# Patient Record
Sex: FEMALE | Race: WHITE | Employment: UNEMPLOYED | URBAN - METROPOLITAN AREA
[De-identification: names, ages, dates, MRNs, and addresses within clinical notes are randomized per-mention and may not be internally consistent; named-entity substitution may affect disease eponyms.]

---

## 2017-11-01 ENCOUNTER — HOSPITAL ENCOUNTER (OUTPATIENT)
Dept: MAMMOGRAPHY | Age: 57
Discharge: HOME | End: 2017-11-01
Attending: OBSTETRICS & GYNECOLOGY | Admitting: OBSTETRICS & GYNECOLOGY

## 2017-11-01 DIAGNOSIS — Z12.31 ENCOUNTER FOR SCREENING MAMMOGRAM FOR MALIGNANT NEOPLASM OF BREAST: ICD-10-CM

## 2018-04-19 ENCOUNTER — TELEPHONE (OUTPATIENT)
Dept: ADMINISTRATIVE | Age: 58
End: 2018-04-19

## 2018-06-19 ENCOUNTER — TELEPHONE (OUTPATIENT)
Dept: PAIN MANAGEMENT | Age: 58
End: 2018-06-19

## 2018-06-19 ENCOUNTER — OFFICE VISIT (OUTPATIENT)
Dept: FAMILY MEDICINE CLINIC | Age: 58
End: 2018-06-19

## 2018-06-19 VITALS
BODY MASS INDEX: 31.05 KG/M2 | DIASTOLIC BLOOD PRESSURE: 80 MMHG | SYSTOLIC BLOOD PRESSURE: 124 MMHG | HEART RATE: 78 BPM | OXYGEN SATURATION: 98 % | WEIGHT: 159 LBS

## 2018-06-19 DIAGNOSIS — J01.00 ACUTE NON-RECURRENT MAXILLARY SINUSITIS: Primary | ICD-10-CM

## 2018-06-19 DIAGNOSIS — M54.2 CERVICAL PAIN (NECK): ICD-10-CM

## 2018-06-19 DIAGNOSIS — I10 ESSENTIAL HYPERTENSION: ICD-10-CM

## 2018-06-19 DIAGNOSIS — E78.2 MIXED HYPERLIPIDEMIA: ICD-10-CM

## 2018-06-19 DIAGNOSIS — M54.41 CHRONIC BILATERAL LOW BACK PAIN WITH RIGHT-SIDED SCIATICA: ICD-10-CM

## 2018-06-19 DIAGNOSIS — G89.29 CHRONIC BILATERAL LOW BACK PAIN WITH RIGHT-SIDED SCIATICA: ICD-10-CM

## 2018-06-19 PROCEDURE — 3017F COLORECTAL CA SCREEN DOC REV: CPT | Performed by: FAMILY MEDICINE

## 2018-06-19 PROCEDURE — 4004F PT TOBACCO SCREEN RCVD TLK: CPT | Performed by: FAMILY MEDICINE

## 2018-06-19 PROCEDURE — G8427 DOCREV CUR MEDS BY ELIG CLIN: HCPCS | Performed by: FAMILY MEDICINE

## 2018-06-19 PROCEDURE — G8419 CALC BMI OUT NRM PARAM NOF/U: HCPCS | Performed by: FAMILY MEDICINE

## 2018-06-19 PROCEDURE — 99202 OFFICE O/P NEW SF 15 MIN: CPT | Performed by: FAMILY MEDICINE

## 2018-06-19 RX ORDER — CEPHALEXIN 500 MG/1
500 CAPSULE ORAL 3 TIMES DAILY
Qty: 30 CAPSULE | Refills: 0 | Status: SHIPPED | OUTPATIENT
Start: 2018-06-19 | End: 2018-06-29

## 2018-06-19 RX ORDER — AMLODIPINE BESYLATE 5 MG/1
5 TABLET ORAL
COMMUNITY
Start: 2018-06-13 | End: 2019-01-04 | Stop reason: SDUPTHER

## 2018-06-19 ASSESSMENT — PATIENT HEALTH QUESTIONNAIRE - PHQ9
SUM OF ALL RESPONSES TO PHQ9 QUESTIONS 1 & 2: 2
1. LITTLE INTEREST OR PLEASURE IN DOING THINGS: 1
2. FEELING DOWN, DEPRESSED OR HOPELESS: 1
SUM OF ALL RESPONSES TO PHQ QUESTIONS 1-9: 2

## 2018-06-20 ASSESSMENT — ENCOUNTER SYMPTOMS
VOMITING: 0
COUGH: 1
SINUS PRESSURE: 1
DIARRHEA: 0

## 2018-06-21 ENCOUNTER — TELEPHONE (OUTPATIENT)
Dept: PAIN MANAGEMENT | Age: 58
End: 2018-06-21

## 2018-06-29 ENCOUNTER — OFFICE VISIT (OUTPATIENT)
Dept: PAIN MANAGEMENT | Age: 58
End: 2018-06-29

## 2018-06-29 VITALS
SYSTOLIC BLOOD PRESSURE: 140 MMHG | HEART RATE: 83 BPM | DIASTOLIC BLOOD PRESSURE: 96 MMHG | BODY MASS INDEX: 31.22 KG/M2 | WEIGHT: 159 LBS | HEIGHT: 60 IN

## 2018-06-29 DIAGNOSIS — M54.16 LUMBAR RADICULOPATHY: ICD-10-CM

## 2018-06-29 DIAGNOSIS — F11.90 CHRONIC, CONTINUOUS USE OF OPIOIDS: ICD-10-CM

## 2018-06-29 DIAGNOSIS — M54.12 CERVICAL RADICULOPATHY: Primary | ICD-10-CM

## 2018-06-29 PROBLEM — F41.9 ANXIETY: Status: ACTIVE | Noted: 2018-06-29

## 2018-06-29 PROBLEM — G89.29 CHRONIC LOW BACK PAIN: Status: ACTIVE | Noted: 2018-06-29

## 2018-06-29 PROBLEM — M54.50 CHRONIC LOW BACK PAIN: Status: ACTIVE | Noted: 2018-06-29

## 2018-06-29 PROBLEM — M54.2 NECK PAIN: Status: ACTIVE | Noted: 2018-06-29

## 2018-06-29 PROBLEM — I10 HYPERTENSION: Status: ACTIVE | Noted: 2018-06-29

## 2018-06-29 PROBLEM — F32.A DEPRESSION: Status: ACTIVE | Noted: 2018-06-29

## 2018-06-29 PROCEDURE — 4004F PT TOBACCO SCREEN RCVD TLK: CPT | Performed by: ANESTHESIOLOGY

## 2018-06-29 PROCEDURE — G8427 DOCREV CUR MEDS BY ELIG CLIN: HCPCS | Performed by: ANESTHESIOLOGY

## 2018-06-29 PROCEDURE — G8417 CALC BMI ABV UP PARAM F/U: HCPCS | Performed by: ANESTHESIOLOGY

## 2018-06-29 PROCEDURE — 3017F COLORECTAL CA SCREEN DOC REV: CPT | Performed by: ANESTHESIOLOGY

## 2018-06-29 PROCEDURE — 99204 OFFICE O/P NEW MOD 45 MIN: CPT | Performed by: ANESTHESIOLOGY

## 2018-06-29 RX ORDER — TRAMADOL HYDROCHLORIDE 50 MG/1
TABLET ORAL
Qty: 100 TABLET | Refills: 0 | Status: SHIPPED | OUTPATIENT
Start: 2018-07-02 | End: 2019-09-16 | Stop reason: ALTCHOICE

## 2018-06-29 RX ORDER — BACLOFEN 10 MG/1
10 TABLET ORAL 2 TIMES DAILY
Qty: 60 TABLET | Refills: 1 | Status: SHIPPED | OUTPATIENT
Start: 2018-06-29 | End: 2018-08-28

## 2018-06-29 ASSESSMENT — PATIENT HEALTH QUESTIONNAIRE - PHQ9
SUM OF ALL RESPONSES TO PHQ QUESTIONS 1-9: 0
SUM OF ALL RESPONSES TO PHQ9 QUESTIONS 1 & 2: 0
2. FEELING DOWN, DEPRESSED OR HOPELESS: 0
1. LITTLE INTEREST OR PLEASURE IN DOING THINGS: 0

## 2018-07-04 LAB
6-ACETYLMORPHINE: NOT DETECTED
7-AMINOCLONAZEPAM: NOT DETECTED
ALPHA-OH-ALPRAZOLAM: NOT DETECTED
ALPRAZOLAM: NOT DETECTED
AMPHETAMINE: NOT DETECTED
BARBITURATES: NOT DETECTED
BENZOYLECGONINE: NOT DETECTED
BUPRENORPHINE: NOT DETECTED
CARISOPRODOL: NOT DETECTED
CLONAZEPAM: NOT DETECTED
CODEINE: NOT DETECTED
CREATININE URINE: 226 MG/DL (ref 20–400)
DIAZEPAM: NOT DETECTED
DRUGS EXPECTED: NORMAL
EER PAIN MGT DRUG PANEL, HIGH RES/EMIT U: NORMAL
ETHYL GLUCURONIDE: NOT DETECTED
FENTANYL: NOT DETECTED
HYDROCODONE: NOT DETECTED
HYDROMORPHONE: NOT DETECTED
LORAZEPAM: NOT DETECTED
MARIJUANA METABOLITE: NOT DETECTED
MDA: NOT DETECTED
MDEA: NOT DETECTED
MDMA URINE: NOT DETECTED
MEPERIDINE: NOT DETECTED
METHADONE: NOT DETECTED
METHAMPHETAMINE: NOT DETECTED
METHYLPHENIDATE: NOT DETECTED
MIDAZOLAM: NOT DETECTED
MORPHINE: NOT DETECTED
NORBUPRENORPHINE, FREE: NOT DETECTED
NORDIAZEPAM: NOT DETECTED
NORFENTANYL: NOT DETECTED
NORHYDROCODONE, URINE: NOT DETECTED
NOROXYCODONE: NOT DETECTED
NOROXYMORPHONE, URINE: NOT DETECTED
OXAZEPAM: NOT DETECTED
OXYCODONE: NOT DETECTED
OXYMORPHONE: NOT DETECTED
PAIN MANAGEMENT DRUG PANEL: NORMAL
PAIN MANAGEMENT DRUG PANEL: NORMAL
PCP: NOT DETECTED
PHENTERMINE: NOT DETECTED
PROPOXYPHENE: NOT DETECTED
TAPENTADOL, URINE: NOT DETECTED
TAPENTADOL-O-SULFATE, URINE: NOT DETECTED
TEMAZEPAM: NOT DETECTED
TRAMADOL: PRESENT
ZOLPIDEM: NOT DETECTED

## 2018-07-23 ENCOUNTER — TELEPHONE (OUTPATIENT)
Dept: FAMILY MEDICINE CLINIC | Age: 58
End: 2018-07-23

## 2018-07-23 DIAGNOSIS — I10 ESSENTIAL HYPERTENSION: Primary | ICD-10-CM

## 2018-07-23 RX ORDER — LOSARTAN POTASSIUM AND HYDROCHLOROTHIAZIDE 25; 100 MG/1; MG/1
1 TABLET ORAL DAILY
Qty: 30 TABLET | Refills: 0 | Status: SHIPPED | OUTPATIENT
Start: 2018-07-23 | End: 2018-11-28

## 2018-07-23 NOTE — TELEPHONE ENCOUNTER
Laina Patterson is requesting refill(s) Losartan-Hydrochlorothiazide (Hyzaar)  Last OV 6/19/18 (pertaining to medication)  LR (First refill for this office) (per medication requested)  Next office visit scheduled or attempted Yes   If no, reason:        [facility] instead of the mail order this month because she is already really low and needs them sooner but in the future to send them through mail order.

## 2018-07-24 RX ORDER — LOSARTAN POTASSIUM AND HYDROCHLOROTHIAZIDE 25; 100 MG/1; MG/1
1 TABLET ORAL DAILY
Qty: 90 TABLET | Refills: 1 | Status: SHIPPED | OUTPATIENT
Start: 2018-07-24 | End: 2019-01-04 | Stop reason: SDUPTHER

## 2018-08-01 ENCOUNTER — TELEPHONE (OUTPATIENT)
Dept: FAMILY MEDICINE CLINIC | Age: 58
End: 2018-08-01

## 2018-08-01 DIAGNOSIS — K21.9 GASTROESOPHAGEAL REFLUX DISEASE, ESOPHAGITIS PRESENCE NOT SPECIFIED: ICD-10-CM

## 2018-08-01 DIAGNOSIS — E78.5 HYPERLIPIDEMIA, UNSPECIFIED HYPERLIPIDEMIA TYPE: ICD-10-CM

## 2018-08-01 DIAGNOSIS — F32.89 OTHER DEPRESSION: ICD-10-CM

## 2018-08-01 DIAGNOSIS — F41.9 ANXIETY: Primary | ICD-10-CM

## 2018-08-01 NOTE — TELEPHONE ENCOUNTER
I called [facility] and spoke with Rae Medrano. I advised her we received a call from Nelia Gonsales at [address] earlier. I asked for clarification from the message earlier about- \"they do not do automatic refills for the prescription\". What prescriptions is she talking about? Rae Medrano states she is not sure what Nelia Gonsales is talking about and is unable to help assist. I advised her if [facility] calls back regarding patient and those \"refills\", please give more specific details. Encounter closed due to insufficient information.

## 2018-08-01 NOTE — TELEPHONE ENCOUNTER
Lisy Found from [facility] called to let us know that they do not do automatic refills for the prescriptions. She would like to speak to a nurse when available.

## 2018-08-03 RX ORDER — VENLAFAXINE HYDROCHLORIDE 75 MG/1
75 CAPSULE, EXTENDED RELEASE ORAL DAILY
Qty: 90 CAPSULE | Refills: 1 | Status: SHIPPED | OUTPATIENT
Start: 2018-08-03 | End: 2019-01-04 | Stop reason: SDUPTHER

## 2018-08-03 RX ORDER — VENLAFAXINE HYDROCHLORIDE 150 MG/1
150 CAPSULE, EXTENDED RELEASE ORAL DAILY
Qty: 90 CAPSULE | Refills: 1 | Status: SHIPPED | OUTPATIENT
Start: 2018-08-03 | End: 2019-01-04 | Stop reason: SDUPTHER

## 2018-08-03 RX ORDER — ATORVASTATIN CALCIUM 40 MG/1
40 TABLET, FILM COATED ORAL DAILY
Qty: 90 TABLET | Refills: 1 | Status: SHIPPED | OUTPATIENT
Start: 2018-08-03 | End: 2019-01-04 | Stop reason: SDUPTHER

## 2018-08-03 RX ORDER — PANTOPRAZOLE SODIUM 40 MG/1
40 TABLET, DELAYED RELEASE ORAL DAILY
Qty: 90 TABLET | Refills: 1 | Status: SHIPPED | OUTPATIENT
Start: 2018-08-03 | End: 2019-01-04 | Stop reason: SDUPTHER

## 2018-08-03 NOTE — TELEPHONE ENCOUNTER
3 Rx's sent (to [facility]), including the Protonix. Nurse - Please clarify she's been taking Protonix for a while, and ask if 20mg or 40mg, also what it's for (? GERD or ?). ALso, please clarify about the Effexor - takes 150mg daily AND one 75mg daily too??? (If so, then please load the 75mg dose too, and I will send it to [facility] too.

## 2018-08-03 NOTE — TELEPHONE ENCOUNTER
I spoke with the pt, she states that she does take the Protonix 40 mg and that she has been on it for \"years\" for indigestion. I asked her if she was definitely taking both the 75 mg and 150 mg of venlafaxine and she states that she is. She states that her old dr had her taking 225 mg.

## 2018-08-15 DIAGNOSIS — I10 ESSENTIAL HYPERTENSION: Primary | ICD-10-CM

## 2018-08-15 RX ORDER — LOSARTAN POTASSIUM AND HYDROCHLOROTHIAZIDE 25; 100 MG/1; MG/1
TABLET ORAL
Qty: 30 TABLET | Refills: 0 | OUTPATIENT
Start: 2018-08-15

## 2018-09-17 ENCOUNTER — NURSE ONLY (OUTPATIENT)
Dept: FAMILY MEDICINE CLINIC | Age: 58
End: 2018-09-17

## 2018-09-17 DIAGNOSIS — E78.2 MIXED HYPERLIPIDEMIA: ICD-10-CM

## 2018-09-17 DIAGNOSIS — F41.9 ANXIETY: ICD-10-CM

## 2018-09-17 DIAGNOSIS — F32.89 OTHER DEPRESSION: ICD-10-CM

## 2018-09-17 DIAGNOSIS — I10 ESSENTIAL HYPERTENSION: ICD-10-CM

## 2018-09-17 DIAGNOSIS — F41.9 ANXIETY: Primary | ICD-10-CM

## 2018-09-17 LAB
A/G RATIO: 1.9 (ref 1.1–2.2)
ALBUMIN SERPL-MCNC: 4.6 G/DL (ref 3.4–5)
ALP BLD-CCNC: 67 U/L (ref 40–129)
ALT SERPL-CCNC: 32 U/L (ref 10–40)
ANION GAP SERPL CALCULATED.3IONS-SCNC: 17 MMOL/L (ref 3–16)
AST SERPL-CCNC: 31 U/L (ref 15–37)
BILIRUB SERPL-MCNC: 0.4 MG/DL (ref 0–1)
BUN BLDV-MCNC: 15 MG/DL (ref 7–20)
CALCIUM SERPL-MCNC: 9.8 MG/DL (ref 8.3–10.6)
CHLORIDE BLD-SCNC: 102 MMOL/L (ref 99–110)
CHOLESTEROL, TOTAL: 177 MG/DL (ref 0–199)
CO2: 25 MMOL/L (ref 21–32)
CREAT SERPL-MCNC: 0.9 MG/DL (ref 0.6–1.1)
GFR AFRICAN AMERICAN: >60
GFR NON-AFRICAN AMERICAN: >60
GLOBULIN: 2.4 G/DL
GLUCOSE BLD-MCNC: 99 MG/DL (ref 70–99)
HDLC SERPL-MCNC: 32 MG/DL (ref 40–60)
LDL CHOLESTEROL CALCULATED: 92 MG/DL
POTASSIUM SERPL-SCNC: 3.6 MMOL/L (ref 3.5–5.1)
SODIUM BLD-SCNC: 144 MMOL/L (ref 136–145)
TOTAL PROTEIN: 7 G/DL (ref 6.4–8.2)
TRIGL SERPL-MCNC: 264 MG/DL (ref 0–150)
VLDLC SERPL CALC-MCNC: 53 MG/DL

## 2018-09-17 PROCEDURE — 36415 COLL VENOUS BLD VENIPUNCTURE: CPT | Performed by: FAMILY MEDICINE

## 2018-09-17 NOTE — TELEPHONE ENCOUNTER
Patient has 2 days left. She is wanting a rx sent to [facility] in [facility]. And then send RX to mail order.        Henrietta Izaguirre is requesting refill(s) busPIRone (BUSPAR) 10 MG tablet   Last OV 6/19/18 (pertaining to medication)  LR  (per medication requested)  Next office visit scheduled or attempted Yes   If no, reason:

## 2018-09-18 RX ORDER — BUSPIRONE HYDROCHLORIDE 10 MG/1
30 TABLET ORAL 3 TIMES DAILY
Qty: 810 TABLET | Refills: 1 | Status: SHIPPED | OUTPATIENT
Start: 2018-09-18 | End: 2018-11-28 | Stop reason: SDUPTHER

## 2018-09-18 RX ORDER — BUSPIRONE HYDROCHLORIDE 10 MG/1
30 TABLET ORAL 3 TIMES DAILY
Qty: 270 TABLET | Refills: 2 | Status: SHIPPED | OUTPATIENT
Start: 2018-09-18 | End: 2019-03-25

## 2018-09-18 NOTE — TELEPHONE ENCOUNTER
Done.    Nurse - please let patient know she is due for a follow-up office visit within the next 1-3 months.

## 2018-09-18 NOTE — TELEPHONE ENCOUNTER
Done - I sent 90-day to her mail-order pharmacy (and earlier today sent local 30-day Rx with refills) as requested. Has office visit with me scheduled (on 1/4/19).

## 2018-09-20 ENCOUNTER — TELEPHONE (OUTPATIENT)
Dept: FAMILY MEDICINE CLINIC | Age: 58
End: 2018-09-20

## 2018-09-20 NOTE — TELEPHONE ENCOUNTER
Fax from [facility] saying her benefit plan only pays for 6  buspar tabs/day and her directions say 3 tabs cande    Spoke with Trenton Stephens and she only takes 3 tabs bid and she just received her script in the mail / will call if problems amt says #[phone] shes not sure how many she got

## 2018-10-31 ENCOUNTER — PATIENT MESSAGE (OUTPATIENT)
Dept: FAMILY MEDICINE CLINIC | Age: 58
End: 2018-10-31

## 2018-10-31 RX ORDER — FENOFIBRATE 160 MG/1
160 TABLET ORAL DAILY
Qty: 90 TABLET | Refills: 3 | Status: SHIPPED | OUTPATIENT
Start: 2018-10-31 | End: 2018-11-28 | Stop reason: SDUPTHER

## 2018-10-31 NOTE — TELEPHONE ENCOUNTER
Ella Zhang is requesting refill(s) fenofibrate  Last OV 6/19/18 (pertaining to medication)  LR ? (per medication requested)  Next office visit scheduled or attempted Yes   If no, reason:  Pt is scheduled for 1/4/19

## 2018-11-05 ENCOUNTER — HOSPITAL ENCOUNTER (OUTPATIENT)
Dept: WOMENS IMAGING | Age: 58
Discharge: HOME | End: 2018-11-05
Payer: COMMERCIAL

## 2018-11-05 DIAGNOSIS — Z12.31 VISIT FOR SCREENING MAMMOGRAM: ICD-10-CM

## 2018-11-05 DIAGNOSIS — M54.12 CERVICAL RADICULOPATHY: ICD-10-CM

## 2018-11-05 PROCEDURE — 77067 SCR MAMMO BI INCL CAD: CPT

## 2018-11-28 ENCOUNTER — OFFICE VISIT (OUTPATIENT)
Dept: FAMILY MEDICINE CLINIC | Age: 58
End: 2018-11-28
Payer: COMMERCIAL

## 2018-11-28 VITALS
OXYGEN SATURATION: 95 % | HEART RATE: 75 BPM | BODY MASS INDEX: 31.02 KG/M2 | HEIGHT: 60 IN | WEIGHT: 158 LBS | DIASTOLIC BLOOD PRESSURE: 72 MMHG | RESPIRATION RATE: 18 BRPM | TEMPERATURE: 97.4 F | SYSTOLIC BLOOD PRESSURE: 120 MMHG

## 2018-11-28 DIAGNOSIS — R19.7 DIARRHEA OF PRESUMED INFECTIOUS ORIGIN: Primary | ICD-10-CM

## 2018-11-28 PROCEDURE — G8484 FLU IMMUNIZE NO ADMIN: HCPCS | Performed by: PHYSICIAN ASSISTANT

## 2018-11-28 PROCEDURE — G8427 DOCREV CUR MEDS BY ELIG CLIN: HCPCS | Performed by: PHYSICIAN ASSISTANT

## 2018-11-28 PROCEDURE — 4004F PT TOBACCO SCREEN RCVD TLK: CPT | Performed by: PHYSICIAN ASSISTANT

## 2018-11-28 PROCEDURE — 3017F COLORECTAL CA SCREEN DOC REV: CPT | Performed by: PHYSICIAN ASSISTANT

## 2018-11-28 PROCEDURE — 99213 OFFICE O/P EST LOW 20 MIN: CPT | Performed by: PHYSICIAN ASSISTANT

## 2018-11-28 PROCEDURE — G8417 CALC BMI ABV UP PARAM F/U: HCPCS | Performed by: PHYSICIAN ASSISTANT

## 2018-11-28 RX ORDER — FENOFIBRATE 160 MG/1
160 TABLET ORAL DAILY
Qty: 90 TABLET | Refills: 3 | Status: SHIPPED | OUTPATIENT
Start: 2018-11-28 | End: 2019-01-04 | Stop reason: SDUPTHER

## 2018-11-28 RX ORDER — METRONIDAZOLE 500 MG/1
500 TABLET ORAL 3 TIMES DAILY
Qty: 21 TABLET | Refills: 0 | Status: SHIPPED | OUTPATIENT
Start: 2018-11-28 | End: 2018-12-05

## 2018-11-28 RX ORDER — CIPROFLOXACIN 750 MG/1
750 TABLET, FILM COATED ORAL 2 TIMES DAILY
Qty: 14 TABLET | Refills: 0 | Status: SHIPPED | OUTPATIENT
Start: 2018-11-28 | End: 2018-12-05

## 2018-11-28 RX ORDER — FENOFIBRATE 160 MG/1
160 TABLET ORAL DAILY
Qty: 90 TABLET | Refills: 3 | Status: SHIPPED | OUTPATIENT
Start: 2018-11-28 | End: 2018-11-28 | Stop reason: CLARIF

## 2018-11-28 NOTE — PROGRESS NOTES
mouth daily 90 tablet 1    venlafaxine (EFFEXOR XR) 75 MG extended release capsule Take 1 capsule by mouth daily (with the 150mg capsule too) 90 capsule 1    losartan-hydrochlorothiazide (HYZAAR) 100-25 MG per tablet Take 1 tablet by mouth daily 90 tablet 1    amLODIPine (NORVASC) 5 MG tablet Take 5 mg by mouth      gabapentin (NEURONTIN) 300 MG capsule Take 300 mg by mouth 3 times daily.  traMADol (ULTRAM) 50 MG tablet Tapering dose: May take 1 tab by mouth q6h for 2 weeks (max 4 tab/day), and then reduce to 1 tab q8h PRN (max 3 tab/day). 100 tablet 0     No current facility-administered medications for this visit. PHYSICAL EXAM     Vitals:    11/28/18 1056   BP: 120/72   Site: Right Upper Arm   Position: Sitting   Pulse: 75   Resp: 18   Temp: 97.4 °F (36.3 °C)   TempSrc: Oral   SpO2: 95%   Weight: 158 lb (71.7 kg)   Height: 5' (1.524 m)     APPEARANCE: Pleasant, friendly, well-nourished. No acute distress. HEENT: Head: Normocephalic, atraumatic. Eyes: EOMI,PERRLA. Conjunctiva pink and moist.  Sclera white. Ears: External ears uniform. Hearing intact. Nose: No septal deviation. Mouth: Oral mucosa moist. Throat is without erythema, exudates, edema. NECK: No lymphadenopathy or masses. HEART: Reg rate and rhythm. No murmurs, rubs, or gallops. LUNGS: Clear to auscultation. No wheezes, rales, or rhonchi. ABDOMEN:  Soft, Hyperactive bowel sounds throughout. Minimal tenderness. No masses or organomegaly. MUSCULOSKELETAL:  No new deformity. No clubbing, cyanosis or edema. NEUROLOGIC: Normal gross and sensory exam.  SKIN: Warm, dry, normal turgor. Cap refill <3secs. No rashes, petechiae, purpura. ADDITIONAL STUDIES     Pertinent laboratory results and/or imaging reviewed. No orders of the defined types were placed in this encounter. IMPRESSION/ PLAN      1.  Diarrhea of presumed infectious origin       Due to the International travel and malodorous stools will add Flagyl to

## 2018-12-10 ENCOUNTER — OFFICE VISIT (OUTPATIENT)
Dept: FAMILY MEDICINE CLINIC | Age: 58
End: 2018-12-10
Payer: COMMERCIAL

## 2018-12-10 VITALS
HEART RATE: 79 BPM | SYSTOLIC BLOOD PRESSURE: 122 MMHG | DIASTOLIC BLOOD PRESSURE: 78 MMHG | WEIGHT: 157.8 LBS | BODY MASS INDEX: 30.98 KG/M2 | TEMPERATURE: 98.2 F | RESPIRATION RATE: 18 BRPM | OXYGEN SATURATION: 94 % | HEIGHT: 60 IN

## 2018-12-10 DIAGNOSIS — K58.0 IRRITABLE BOWEL SYNDROME WITH DIARRHEA: Primary | ICD-10-CM

## 2018-12-10 PROCEDURE — 4004F PT TOBACCO SCREEN RCVD TLK: CPT | Performed by: PHYSICIAN ASSISTANT

## 2018-12-10 PROCEDURE — G8427 DOCREV CUR MEDS BY ELIG CLIN: HCPCS | Performed by: PHYSICIAN ASSISTANT

## 2018-12-10 PROCEDURE — G8417 CALC BMI ABV UP PARAM F/U: HCPCS | Performed by: PHYSICIAN ASSISTANT

## 2018-12-10 PROCEDURE — 3017F COLORECTAL CA SCREEN DOC REV: CPT | Performed by: PHYSICIAN ASSISTANT

## 2018-12-10 PROCEDURE — 99213 OFFICE O/P EST LOW 20 MIN: CPT | Performed by: PHYSICIAN ASSISTANT

## 2018-12-10 PROCEDURE — G8484 FLU IMMUNIZE NO ADMIN: HCPCS | Performed by: PHYSICIAN ASSISTANT

## 2019-01-04 ENCOUNTER — OFFICE VISIT (OUTPATIENT)
Dept: FAMILY MEDICINE CLINIC | Age: 59
End: 2019-01-04
Payer: COMMERCIAL

## 2019-01-04 VITALS
OXYGEN SATURATION: 98 % | WEIGHT: 157.2 LBS | SYSTOLIC BLOOD PRESSURE: 128 MMHG | DIASTOLIC BLOOD PRESSURE: 60 MMHG | HEART RATE: 79 BPM | BODY MASS INDEX: 30.86 KG/M2 | HEIGHT: 60 IN

## 2019-01-04 DIAGNOSIS — F41.9 ANXIETY: ICD-10-CM

## 2019-01-04 DIAGNOSIS — F17.200 TOBACCO USE DISORDER: ICD-10-CM

## 2019-01-04 DIAGNOSIS — I10 ESSENTIAL HYPERTENSION: Primary | ICD-10-CM

## 2019-01-04 DIAGNOSIS — F32.89 OTHER DEPRESSION: ICD-10-CM

## 2019-01-04 DIAGNOSIS — G89.29 CHRONIC RIGHT-SIDED LOW BACK PAIN WITH SCIATICA, SCIATICA LATERALITY UNSPECIFIED: ICD-10-CM

## 2019-01-04 DIAGNOSIS — K58.0 IRRITABLE BOWEL SYNDROME WITH DIARRHEA: ICD-10-CM

## 2019-01-04 DIAGNOSIS — M54.40 CHRONIC RIGHT-SIDED LOW BACK PAIN WITH SCIATICA, SCIATICA LATERALITY UNSPECIFIED: ICD-10-CM

## 2019-01-04 DIAGNOSIS — E78.5 HYPERLIPIDEMIA, UNSPECIFIED HYPERLIPIDEMIA TYPE: ICD-10-CM

## 2019-01-04 DIAGNOSIS — K21.9 GASTROESOPHAGEAL REFLUX DISEASE, ESOPHAGITIS PRESENCE NOT SPECIFIED: ICD-10-CM

## 2019-01-04 PROCEDURE — 99214 OFFICE O/P EST MOD 30 MIN: CPT | Performed by: FAMILY MEDICINE

## 2019-01-04 RX ORDER — VENLAFAXINE HYDROCHLORIDE 75 MG/1
75 CAPSULE, EXTENDED RELEASE ORAL DAILY
Qty: 90 CAPSULE | Refills: 1 | Status: SHIPPED | OUTPATIENT
Start: 2019-01-04 | End: 2019-07-04 | Stop reason: SDUPTHER

## 2019-01-04 RX ORDER — ATORVASTATIN CALCIUM 40 MG/1
40 TABLET, FILM COATED ORAL DAILY
Qty: 90 TABLET | Refills: 1 | Status: SHIPPED | OUTPATIENT
Start: 2019-01-04 | End: 2019-07-04 | Stop reason: SDUPTHER

## 2019-01-04 RX ORDER — LOSARTAN POTASSIUM AND HYDROCHLOROTHIAZIDE 25; 100 MG/1; MG/1
1 TABLET ORAL DAILY
Qty: 90 TABLET | Refills: 1 | Status: SHIPPED | OUTPATIENT
Start: 2019-01-04 | End: 2019-07-04 | Stop reason: SDUPTHER

## 2019-01-04 RX ORDER — PANTOPRAZOLE SODIUM 40 MG/1
40 TABLET, DELAYED RELEASE ORAL DAILY
Qty: 90 TABLET | Refills: 1 | Status: SHIPPED | OUTPATIENT
Start: 2019-01-04 | End: 2019-07-04 | Stop reason: SDUPTHER

## 2019-01-04 RX ORDER — BUSPIRONE HYDROCHLORIDE 30 MG/1
30 TABLET ORAL 3 TIMES DAILY
Qty: 90 TABLET | Refills: 1 | Status: SHIPPED | OUTPATIENT
Start: 2019-01-04 | End: 2019-03-25 | Stop reason: SDUPTHER

## 2019-01-04 RX ORDER — FENOFIBRATE 160 MG/1
160 TABLET ORAL DAILY
Qty: 90 TABLET | Refills: 3 | Status: SHIPPED | OUTPATIENT
Start: 2019-01-04 | End: 2019-12-31

## 2019-01-04 RX ORDER — AMLODIPINE BESYLATE 5 MG/1
5 TABLET ORAL DAILY
Qty: 90 TABLET | Refills: 1 | Status: SHIPPED | OUTPATIENT
Start: 2019-01-04 | End: 2019-07-04 | Stop reason: SDUPTHER

## 2019-01-04 RX ORDER — VENLAFAXINE HYDROCHLORIDE 150 MG/1
150 CAPSULE, EXTENDED RELEASE ORAL DAILY
Qty: 90 CAPSULE | Refills: 1 | Status: SHIPPED | OUTPATIENT
Start: 2019-01-04 | End: 2019-11-21 | Stop reason: SDUPTHER

## 2019-01-06 PROBLEM — F17.200 TOBACCO USE DISORDER: Status: ACTIVE | Noted: 2019-01-06

## 2019-01-06 ASSESSMENT — ENCOUNTER SYMPTOMS
ABDOMINAL PAIN: 0
DIARRHEA: 0
SHORTNESS OF BREATH: 0
EYE PAIN: 0
COUGH: 0

## 2019-03-07 DIAGNOSIS — E78.5 HYPERLIPIDEMIA, UNSPECIFIED HYPERLIPIDEMIA TYPE: ICD-10-CM

## 2019-03-07 DIAGNOSIS — I10 ESSENTIAL HYPERTENSION: Primary | ICD-10-CM

## 2019-03-07 DIAGNOSIS — Z86.39 HISTORY OF ELEVATED GLUCOSE: ICD-10-CM

## 2019-03-25 DIAGNOSIS — F41.9 ANXIETY: ICD-10-CM

## 2019-03-25 RX ORDER — BUSPIRONE HYDROCHLORIDE 10 MG/1
30 TABLET ORAL 2 TIMES DAILY
Qty: 60 TABLET | Refills: 0 | Status: SHIPPED | OUTPATIENT
Start: 2019-03-25 | End: 2019-04-23 | Stop reason: SDUPTHER

## 2019-03-25 RX ORDER — BUSPIRONE HYDROCHLORIDE 30 MG/1
30 TABLET ORAL 2 TIMES DAILY
Qty: 180 TABLET | Refills: 1 | Status: SHIPPED | OUTPATIENT
Start: 2019-03-25 | End: 2019-05-29 | Stop reason: SDUPTHER

## 2019-03-27 ENCOUNTER — NURSE ONLY (OUTPATIENT)
Dept: FAMILY MEDICINE CLINIC | Age: 59
End: 2019-03-27
Payer: COMMERCIAL

## 2019-03-27 DIAGNOSIS — Z86.39 HISTORY OF ELEVATED GLUCOSE: ICD-10-CM

## 2019-03-27 DIAGNOSIS — I10 ESSENTIAL HYPERTENSION: ICD-10-CM

## 2019-03-27 DIAGNOSIS — E78.5 HYPERLIPIDEMIA, UNSPECIFIED HYPERLIPIDEMIA TYPE: ICD-10-CM

## 2019-03-27 LAB
ANION GAP SERPL CALCULATED.3IONS-SCNC: 11 MMOL/L (ref 3–16)
BUN BLDV-MCNC: 13 MG/DL (ref 7–20)
CALCIUM SERPL-MCNC: 10.5 MG/DL (ref 8.3–10.6)
CHLORIDE BLD-SCNC: 103 MMOL/L (ref 99–110)
CHOLESTEROL, TOTAL: 174 MG/DL (ref 0–199)
CO2: 29 MMOL/L (ref 21–32)
CREAT SERPL-MCNC: 0.8 MG/DL (ref 0.6–1.1)
GFR AFRICAN AMERICAN: >60
GFR NON-AFRICAN AMERICAN: >60
GLUCOSE BLD-MCNC: 95 MG/DL (ref 70–99)
HDLC SERPL-MCNC: 35 MG/DL (ref 40–60)
LDL CHOLESTEROL CALCULATED: 97 MG/DL
POTASSIUM SERPL-SCNC: 4 MMOL/L (ref 3.5–5.1)
SODIUM BLD-SCNC: 143 MMOL/L (ref 136–145)
TRIGL SERPL-MCNC: 210 MG/DL (ref 0–150)
VLDLC SERPL CALC-MCNC: 42 MG/DL

## 2019-03-27 PROCEDURE — 36415 COLL VENOUS BLD VENIPUNCTURE: CPT | Performed by: FAMILY MEDICINE

## 2019-03-28 ENCOUNTER — TELEPHONE (OUTPATIENT)
Dept: FAMILY MEDICINE CLINIC | Age: 59
End: 2019-03-28

## 2019-03-28 LAB
ESTIMATED AVERAGE GLUCOSE: 128.4 MG/DL
HBA1C MFR BLD: 6.1 %

## 2019-04-23 RX ORDER — BUSPIRONE HYDROCHLORIDE 10 MG/1
30 TABLET ORAL 2 TIMES DAILY
Qty: 60 TABLET | Refills: 0 | Status: SHIPPED | OUTPATIENT
Start: 2019-04-23 | End: 2019-06-25 | Stop reason: SDUPTHER

## 2019-04-23 NOTE — TELEPHONE ENCOUNTER
Jose Wan [phone] (home)    is requesting refill(s) of medication [facility]  [facility]     Last OV 1/4/19 (pertaining to medication)   Last refill 3/25/19 (per medication requested)  Next office visit scheduled or attempted Yes  Date 10/11/19  If No, reason

## 2019-05-29 DIAGNOSIS — F41.9 ANXIETY: ICD-10-CM

## 2019-05-29 NOTE — TELEPHONE ENCOUNTER
Patient called stating that she received a prescription for the incorrect dosage once again. This has happened to her plenty of times and she needs the busPIRone (BUSPAR) 30 MG tablet    for 3 times daily sent to her pharmacy not the two times daily.

## 2019-05-31 RX ORDER — BUSPIRONE HYDROCHLORIDE 30 MG/1
30 TABLET ORAL 3 TIMES DAILY
Qty: 270 TABLET | Refills: 1 | Status: SHIPPED | OUTPATIENT
Start: 2019-05-31 | End: 2019-06-25

## 2019-07-04 DIAGNOSIS — E78.5 HYPERLIPIDEMIA, UNSPECIFIED HYPERLIPIDEMIA TYPE: ICD-10-CM

## 2019-07-04 DIAGNOSIS — F32.89 OTHER DEPRESSION: ICD-10-CM

## 2019-07-04 DIAGNOSIS — F41.9 ANXIETY: ICD-10-CM

## 2019-07-04 DIAGNOSIS — I10 ESSENTIAL HYPERTENSION: ICD-10-CM

## 2019-07-04 DIAGNOSIS — K21.9 GASTROESOPHAGEAL REFLUX DISEASE, ESOPHAGITIS PRESENCE NOT SPECIFIED: ICD-10-CM

## 2019-07-05 NOTE — TELEPHONE ENCOUNTER
Nola Merino is requesting refill(s) Effexor  Last OV 1/4/19 (pertaining to medication)  LR 4/23/19 (per medication requested)  Next office visit scheduled or attempted Yes, 10/11/19   If no, reason:      Nola Merino is requesting refill(s) Hyzaar  Last OV 1/4/19 (pertaining to medication)  LR 4/23/19 (per medication requested)  Next office visit scheduled or attempted Yes, 10/11/19   If no, reason:      Nola Merino is requesting refill(s) Protonix  Last OV 1/4/19 (pertaining to medication)  LR 4/23/19 (per medication requested)  Next office visit scheduled or attempted Yes, 10/11/19   If no, reason:      Nola Merino is requesting refill(s) Norvasc  Last OV 1/4/19 (pertaining to medication)  LR 4/23/19 (per medication requested)  Next office visit scheduled or attempted Yes, 10/11/19   If no, reason:        Nola Merino is requesting refill(s) Lipitor   Last OV 1/4/19 (pertaining to medication)  LR 4/23/19 (per medication requested)  Next office visit scheduled or attempted Yes, 10/11/19   If no, reason:

## 2019-07-08 RX ORDER — AMLODIPINE BESYLATE 5 MG/1
TABLET ORAL
Qty: 90 TABLET | Refills: 1 | Status: SHIPPED | OUTPATIENT
Start: 2019-07-08 | End: 2020-01-07

## 2019-07-08 RX ORDER — ATORVASTATIN CALCIUM 40 MG/1
TABLET, FILM COATED ORAL
Qty: 90 TABLET | Refills: 1 | Status: SHIPPED | OUTPATIENT
Start: 2019-07-08 | End: 2020-01-07

## 2019-07-08 RX ORDER — LOSARTAN POTASSIUM AND HYDROCHLOROTHIAZIDE 25; 100 MG/1; MG/1
TABLET ORAL
Qty: 90 TABLET | Refills: 1 | Status: SHIPPED | OUTPATIENT
Start: 2019-07-08 | End: 2020-01-07

## 2019-07-08 RX ORDER — VENLAFAXINE HYDROCHLORIDE 75 MG/1
CAPSULE, EXTENDED RELEASE ORAL
Qty: 90 CAPSULE | Refills: 1 | Status: SHIPPED | OUTPATIENT
Start: 2019-07-08 | End: 2020-01-16

## 2019-07-08 RX ORDER — PANTOPRAZOLE SODIUM 40 MG/1
TABLET, DELAYED RELEASE ORAL
Qty: 90 TABLET | Refills: 1 | Status: SHIPPED | OUTPATIENT
Start: 2019-07-08 | End: 2020-01-07

## 2019-09-16 ENCOUNTER — OFFICE VISIT (OUTPATIENT)
Dept: FAMILY MEDICINE CLINIC | Age: 59
End: 2019-09-16
Payer: COMMERCIAL

## 2019-09-16 VITALS
DIASTOLIC BLOOD PRESSURE: 64 MMHG | WEIGHT: 156 LBS | BODY MASS INDEX: 30.63 KG/M2 | SYSTOLIC BLOOD PRESSURE: 130 MMHG | HEIGHT: 60 IN | OXYGEN SATURATION: 99 % | TEMPERATURE: 98 F | HEART RATE: 93 BPM

## 2019-09-16 DIAGNOSIS — R35.0 URINARY FREQUENCY: ICD-10-CM

## 2019-09-16 DIAGNOSIS — R30.0 DYSURIA: Primary | ICD-10-CM

## 2019-09-16 LAB
BILIRUBIN, POC: NORMAL
BLOOD URINE, POC: NORMAL
CLARITY, POC: CLEAR
COLOR, POC: NORMAL
GLUCOSE URINE, POC: 0
KETONES, POC: 0
LEUKOCYTE EST, POC: NORMAL
NITRITE, POC: POSITIVE
PH, POC: 6.5
PROTEIN, POC: NORMAL
SPECIFIC GRAVITY, POC: 1.03
UROBILINOGEN, POC: 4

## 2019-09-16 PROCEDURE — 81002 URINALYSIS NONAUTO W/O SCOPE: CPT | Performed by: FAMILY MEDICINE

## 2019-09-16 PROCEDURE — 99213 OFFICE O/P EST LOW 20 MIN: CPT | Performed by: FAMILY MEDICINE

## 2019-09-16 RX ORDER — SULFAMETHOXAZOLE AND TRIMETHOPRIM 800; 160 MG/1; MG/1
1 TABLET ORAL 2 TIMES DAILY
Qty: 10 TABLET | Refills: 0 | Status: SHIPPED | OUTPATIENT
Start: 2019-09-16 | End: 2019-09-21

## 2019-09-17 ASSESSMENT — ENCOUNTER SYMPTOMS
NAUSEA: 0
VOMITING: 0
BACK PAIN: 0

## 2019-09-18 LAB
ORGANISM: ABNORMAL
URINE CULTURE, ROUTINE: ABNORMAL

## 2019-10-17 ENCOUNTER — OFFICE VISIT (OUTPATIENT)
Dept: FAMILY MEDICINE CLINIC | Age: 59
End: 2019-10-17
Payer: COMMERCIAL

## 2019-10-17 DIAGNOSIS — F41.9 ANXIETY: ICD-10-CM

## 2019-10-17 DIAGNOSIS — R73.09 ELEVATED GLUCOSE: ICD-10-CM

## 2019-10-17 DIAGNOSIS — F17.200 TOBACCO USE DISORDER: ICD-10-CM

## 2019-10-17 DIAGNOSIS — K21.9 GASTROESOPHAGEAL REFLUX DISEASE, ESOPHAGITIS PRESENCE NOT SPECIFIED: ICD-10-CM

## 2019-10-17 DIAGNOSIS — Z23 NEED FOR INFLUENZA VACCINATION: ICD-10-CM

## 2019-10-17 DIAGNOSIS — R05.9 COUGH: ICD-10-CM

## 2019-10-17 DIAGNOSIS — I10 ESSENTIAL HYPERTENSION: Primary | ICD-10-CM

## 2019-10-17 DIAGNOSIS — E78.2 MIXED HYPERLIPIDEMIA: ICD-10-CM

## 2019-10-17 LAB
ANION GAP SERPL CALCULATED.3IONS-SCNC: 18 MMOL/L (ref 3–16)
BUN BLDV-MCNC: 12 MG/DL (ref 7–20)
CALCIUM SERPL-MCNC: 10 MG/DL (ref 8.3–10.6)
CHLORIDE BLD-SCNC: 103 MMOL/L (ref 99–110)
CHOLESTEROL, TOTAL: 169 MG/DL (ref 0–199)
CO2: 23 MMOL/L (ref 21–32)
CREAT SERPL-MCNC: 0.8 MG/DL (ref 0.6–1.1)
GFR AFRICAN AMERICAN: >60
GFR NON-AFRICAN AMERICAN: >60
GLUCOSE BLD-MCNC: 88 MG/DL (ref 70–99)
HDLC SERPL-MCNC: 35 MG/DL (ref 40–60)
LDL CHOLESTEROL CALCULATED: 97 MG/DL
POTASSIUM SERPL-SCNC: 4 MMOL/L (ref 3.5–5.1)
SODIUM BLD-SCNC: 144 MMOL/L (ref 136–145)
TRIGL SERPL-MCNC: 186 MG/DL (ref 0–150)
VLDLC SERPL CALC-MCNC: 37 MG/DL

## 2019-10-17 PROCEDURE — 90686 IIV4 VACC NO PRSV 0.5 ML IM: CPT | Performed by: FAMILY MEDICINE

## 2019-10-17 PROCEDURE — 99214 OFFICE O/P EST MOD 30 MIN: CPT | Performed by: FAMILY MEDICINE

## 2019-10-17 PROCEDURE — 90471 IMMUNIZATION ADMIN: CPT | Performed by: FAMILY MEDICINE

## 2019-10-17 PROCEDURE — 36415 COLL VENOUS BLD VENIPUNCTURE: CPT | Performed by: FAMILY MEDICINE

## 2019-10-18 VITALS
WEIGHT: 158.6 LBS | DIASTOLIC BLOOD PRESSURE: 74 MMHG | HEIGHT: 60 IN | TEMPERATURE: 97.8 F | HEART RATE: 76 BPM | BODY MASS INDEX: 31.14 KG/M2 | SYSTOLIC BLOOD PRESSURE: 166 MMHG | OXYGEN SATURATION: 99 %

## 2019-10-18 LAB
ESTIMATED AVERAGE GLUCOSE: 119.8 MG/DL
HBA1C MFR BLD: 5.8 %

## 2019-10-18 ASSESSMENT — ENCOUNTER SYMPTOMS
COUGH: 1
EYE PAIN: 0
SHORTNESS OF BREATH: 0
WHEEZING: 0
ABDOMINAL PAIN: 0
DIARRHEA: 0

## 2019-10-23 DIAGNOSIS — F41.9 ANXIETY: ICD-10-CM

## 2019-10-23 RX ORDER — BUSPIRONE HYDROCHLORIDE 30 MG/1
TABLET ORAL
Qty: 270 TABLET | Refills: 1 | OUTPATIENT
Start: 2019-10-23

## 2019-11-12 ENCOUNTER — TELEPHONE (OUTPATIENT)
Dept: FAMILY MEDICINE CLINIC | Age: 59
End: 2019-11-12

## 2019-11-12 DIAGNOSIS — F41.9 ANXIETY: Primary | ICD-10-CM

## 2019-11-12 RX ORDER — BUSPIRONE HYDROCHLORIDE 10 MG/1
10 TABLET ORAL 3 TIMES DAILY
Qty: 270 TABLET | Refills: 1 | Status: SHIPPED | OUTPATIENT
Start: 2019-11-12 | End: 2020-05-04 | Stop reason: SDUPTHER

## 2019-11-21 DIAGNOSIS — F41.9 ANXIETY: ICD-10-CM

## 2019-11-21 DIAGNOSIS — F32.89 OTHER DEPRESSION: ICD-10-CM

## 2019-11-25 RX ORDER — VENLAFAXINE HYDROCHLORIDE 150 MG/1
CAPSULE, EXTENDED RELEASE ORAL
Qty: 90 CAPSULE | Refills: 1 | Status: SHIPPED | OUTPATIENT
Start: 2019-11-25 | End: 2020-05-26

## 2019-12-28 ENCOUNTER — HOSPITAL ENCOUNTER (OUTPATIENT)
Dept: WOMENS IMAGING | Age: 59
Discharge: HOME | End: 2019-12-28
Payer: COMMERCIAL

## 2019-12-28 DIAGNOSIS — Z12.31 ENCOUNTER FOR SCREENING MAMMOGRAM FOR BREAST CANCER: ICD-10-CM

## 2019-12-28 PROCEDURE — 77067 SCR MAMMO BI INCL CAD: CPT

## 2019-12-31 DIAGNOSIS — E78.5 HYPERLIPIDEMIA, UNSPECIFIED HYPERLIPIDEMIA TYPE: ICD-10-CM

## 2019-12-31 RX ORDER — FENOFIBRATE 160 MG/1
TABLET ORAL
Qty: 90 TABLET | Refills: 1 | Status: SHIPPED | OUTPATIENT
Start: 2019-12-31 | End: 2020-05-29 | Stop reason: SDUPTHER

## 2020-01-06 NOTE — TELEPHONE ENCOUNTER
Melany Marrero is requesting refill(s) losartan/hctz  Last OV 10/17/19 (pertaining to medication)  LR 7/8/19 (per medication requested)  Next office visit scheduled or attempted Yes   If no, reason:  4/23/20    Melany Marrero is requesting refill(s) pantoprazole  Last OV 10/17/19 (pertaining to medication)  LR 7/8/19 (per medication requested)  Next office visit scheduled or attempted Yes   If no, reason:  4/23/20    Melany Marrero is requesting refill(s) lipitor  Last OV 10/17/19 (pertaining to medication)  LR 7/8/19 (per medication requested)  Next office visit scheduled or attempted Yes   If no, reason:  4/23/20     Melany Marrero is requesting refill(s) amlodipine   Last OV 10/17/19 (pertaining to medication)  LR 7/8/19 (per medication requested)  Next office visit scheduled or attempted Yes   If no, reason:  4/23/20

## 2020-01-07 RX ORDER — AMLODIPINE BESYLATE 5 MG/1
TABLET ORAL
Qty: 90 TABLET | Refills: 1 | Status: SHIPPED | OUTPATIENT
Start: 2020-01-07 | End: 2020-05-29 | Stop reason: SDUPTHER

## 2020-01-07 RX ORDER — ATORVASTATIN CALCIUM 40 MG/1
TABLET, FILM COATED ORAL
Qty: 90 TABLET | Refills: 1 | Status: SHIPPED | OUTPATIENT
Start: 2020-01-07 | End: 2020-05-29 | Stop reason: SDUPTHER

## 2020-01-07 RX ORDER — PANTOPRAZOLE SODIUM 40 MG/1
TABLET, DELAYED RELEASE ORAL
Qty: 90 TABLET | Refills: 1 | Status: SHIPPED | OUTPATIENT
Start: 2020-01-07 | End: 2020-05-29 | Stop reason: SDUPTHER

## 2020-01-07 RX ORDER — LOSARTAN POTASSIUM AND HYDROCHLOROTHIAZIDE 25; 100 MG/1; MG/1
TABLET ORAL
Qty: 90 TABLET | Refills: 1 | Status: SHIPPED | OUTPATIENT
Start: 2020-01-07 | End: 2020-05-29 | Stop reason: SDUPTHER

## 2020-01-17 RX ORDER — VENLAFAXINE HYDROCHLORIDE 75 MG/1
CAPSULE, EXTENDED RELEASE ORAL
Qty: 90 CAPSULE | Refills: 1 | Status: SHIPPED | OUTPATIENT
Start: 2020-01-17 | End: 2020-05-26 | Stop reason: SDUPTHER

## 2020-01-27 ENCOUNTER — OFFICE VISIT (OUTPATIENT)
Dept: FAMILY MEDICINE CLINIC | Age: 60
End: 2020-01-27
Payer: COMMERCIAL

## 2020-01-27 ENCOUNTER — TELEPHONE (OUTPATIENT)
Dept: FAMILY MEDICINE CLINIC | Age: 60
End: 2020-01-27

## 2020-01-27 VITALS
SYSTOLIC BLOOD PRESSURE: 134 MMHG | WEIGHT: 157.2 LBS | HEIGHT: 60 IN | BODY MASS INDEX: 30.86 KG/M2 | DIASTOLIC BLOOD PRESSURE: 72 MMHG | OXYGEN SATURATION: 95 % | HEART RATE: 95 BPM

## 2020-01-27 PROCEDURE — 99213 OFFICE O/P EST LOW 20 MIN: CPT | Performed by: PHYSICIAN ASSISTANT

## 2020-01-27 RX ORDER — TRAMADOL HYDROCHLORIDE 50 MG/1
TABLET ORAL
COMMUNITY
Start: 2019-12-18 | End: 2020-05-26

## 2020-01-27 RX ORDER — CEPHALEXIN 500 MG/1
500 CAPSULE ORAL 4 TIMES DAILY
Qty: 40 CAPSULE | Refills: 0 | Status: SHIPPED | OUTPATIENT
Start: 2020-01-27 | End: 2020-05-29 | Stop reason: ALTCHOICE

## 2020-01-30 ENCOUNTER — OFFICE VISIT (OUTPATIENT)
Dept: FAMILY MEDICINE CLINIC | Age: 60
End: 2020-01-30
Payer: COMMERCIAL

## 2020-01-30 VITALS
DIASTOLIC BLOOD PRESSURE: 72 MMHG | OXYGEN SATURATION: 98 % | SYSTOLIC BLOOD PRESSURE: 146 MMHG | TEMPERATURE: 97.9 F | BODY MASS INDEX: 30.98 KG/M2 | WEIGHT: 157.8 LBS | RESPIRATION RATE: 14 BRPM | HEIGHT: 60 IN | HEART RATE: 83 BPM

## 2020-01-30 PROCEDURE — 10061 I&D ABSCESS COMP/MULTIPLE: CPT | Performed by: PHYSICIAN ASSISTANT

## 2020-01-30 NOTE — PATIENT INSTRUCTIONS
WOUND CARE INSTRUCTIONS:      Keep dressing clean, dry, and intact. If dressing becomes wet or soiled, then change dressing more than once a day. Monitor for signs of infection such as fever, increased pain or redness, or pus,  and return to clinic immediately. If packing is applied, pull packing from wound in 36-48 hours. Use Ibuprofen or Tylenol for pain.

## 2020-01-30 NOTE — PROGRESS NOTES
ASSESSMENT/PLAN:    1. Abscess of left groin    2. Cellulitis of groin      - complete antibiotics. Monitor for signs of infection such as redness, streaking, pain, fever, or wound purulence and return to clinic immediately. Wound care instructions given.

## 2020-02-13 ENCOUNTER — OFFICE VISIT (OUTPATIENT)
Dept: FAMILY MEDICINE CLINIC | Age: 60
End: 2020-02-13
Payer: COMMERCIAL

## 2020-02-13 VITALS
HEART RATE: 80 BPM | SYSTOLIC BLOOD PRESSURE: 126 MMHG | WEIGHT: 157.2 LBS | RESPIRATION RATE: 18 BRPM | OXYGEN SATURATION: 96 % | DIASTOLIC BLOOD PRESSURE: 76 MMHG | HEIGHT: 60 IN | BODY MASS INDEX: 30.86 KG/M2 | TEMPERATURE: 98.1 F

## 2020-02-13 LAB
BILIRUBIN, POC: NORMAL
BLOOD URINE, POC: NORMAL
CLARITY, POC: NORMAL
COLOR, POC: NORMAL
GLUCOSE URINE, POC: NORMAL
KETONES, POC: NORMAL
LEUKOCYTE EST, POC: NORMAL
NITRITE, POC: POSITIVE
PH, POC: 5.5
PROTEIN, POC: 300
SPECIFIC GRAVITY, POC: 1.01
UROBILINOGEN, POC: 8

## 2020-02-13 PROCEDURE — 81002 URINALYSIS NONAUTO W/O SCOPE: CPT | Performed by: PHYSICIAN ASSISTANT

## 2020-02-13 PROCEDURE — 99213 OFFICE O/P EST LOW 20 MIN: CPT | Performed by: PHYSICIAN ASSISTANT

## 2020-02-13 RX ORDER — CIPROFLOXACIN 500 MG/1
500 TABLET, FILM COATED ORAL 2 TIMES DAILY
Qty: 10 TABLET | Refills: 0 | Status: SHIPPED | OUTPATIENT
Start: 2020-02-13 | End: 2020-02-18

## 2020-02-13 NOTE — PROGRESS NOTES
[address]ree [address] 28 COMPLAINT  Chief Complaint   Patient presents with    Cyst     Pt has a cyst on on right groin area. She wants to be looked at. Seen 1/30/20    Urinary Frequency     Pt has urinary frequency, pain and pressure x 1 day       HISTORY OF PRESENT  ILLNESS  Melany Marrero is a 63 y.o.  female   here for multiple medical issues  1. Dysuria, hematuria, or suprapubic pain consistent with her last urinary tract infection 6 months ago. Began Pyridium last night which helped. Urine today is bright orange. Denies fever, chills, flank pain. Denies back pain or radiating pain. Denies abnormal vaginal discharge, bleeding, burning. Denies GI symptoms, no melena or hematochezia, no diarrhea or constipation. 2.  Once abscess on left groin rechecked. 3.  Has atypical mole on right temple. Is concerned due to color changes and enlargement. Has seen dermatologist in the past for this although many years ago. ROS:  Remaining 14 ROS were reviewed and are unremarkable for other constitutional, EENT, cardiac, pulmonary, GI, , neurologic, musculoskeletal, or integumentary complaints. PAST MEDICAL/SURGICAL, SOCIAL, &  FAMILY HISTORY:  Reviewed and updated accordingly. ALLERGIES :   Erythromycin    MEDICATIONS:  Prior to Visit Medications    Medication Sig Taking?  Authorizing Provider   ciprofloxacin (CIPRO) 500 MG tablet Take 1 tablet by mouth 2 times daily for 5 days Yes MARY New   traMADol (ULTRAM) 50 MG tablet  Yes Historical Provider, MD   venlafaxine (EFFEXOR XR) 75 MG extended release capsule TAKE 1 CAPSULE DAILY (WITH  MG CAPSULE TOO) Yes Nino Watkins MD   amLODIPine (NORVASC) 5 MG tablet TAKE 1 TABLET DAILY Yes Krysta Haines MD   atorvastatin (LIPITOR) 40 MG tablet TAKE 1 TABLET DAILY Yes Nino Watkins MD   pantoprazole (PROTONIX) 40 MG tablet TAKE 1 TABLET DAILY Yes Krysta Haines MD   losartan-hydrochlorothiazide (HYZAAR) 100-25 MG per tablet TAKE 1 TABLET DAILY Yes Nino Watkins MD   fenofibrate 160 MG tablet TAKE 1 TABLET DAILY Yes Remigio Vasques MD   venlafaxine (EFFEXOR XR) 150 MG extended release capsule TAKE 1 CAPSULE DAILY Yes Nino Watkins MD   busPIRone (BUSPAR) 10 MG tablet Take 1 tablet by mouth 3 times daily Yes Nino Watkins MD   gabapentin (NEURONTIN) 300 MG capsule Take 300 mg by mouth 3 times daily. Yes Historical Provider, MD   cephALEXin (KEFLEX) 500 MG capsule Take 1 capsule by mouth 4 times daily  Patient not taking: Reported on 2/13/2020  MARY Headley   hyoscyamine (LEVSIN/SL) 125 MCG sublingual tablet Place 1 tablet under the tongue every 4 hours as needed for Cramping  Patient not taking: Reported on 1/27/2020  MARY Robins         PHYSICAL EXAM     Vitals:    02/13/20 1436   BP: 126/76   Site: Left Upper Arm   Position: Sitting   Pulse: 80   Resp: 18   Temp: 98.1 °F (36.7 °C)   TempSrc: Oral   SpO2: 96%   Weight: 157 lb 3.2 oz (71.3 kg)   Height: 5' (1.524 m)   Body mass index is 30.7 kg/m². APPEARANCE: Pleasant, friendly, well-nourished. No acute distress. Right temple shows a multicolored, irregular bordered, raised, atypical lesion. [place] .Eyes: EOMI,PERRLA. Conjunctiva pink and moist. Sclera white. Ears: Hearing intact. Nose:  Nares patent. Mouth: Oral mucosa moist. Throat is clear. NECK: No lymphadenopathy or masses. Moves neck fully. HEART: Reg rate and rhythm. No murmurs, rubs, or gallops. LUNGS: Clear to auscultation. No wheezes, rales, or rhonchi. ABDOMEN:  Soft, bowel sounds present, suprapubic tenderness. No CVAT. Left groin is a 4 x 2 cm hard nontender eschar in the left inguinal region. No surrounding inguinal lymphadenopathy. MUSCULOSKELETAL:  No clubbing, cyanosis or edema. NEUROLOGIC: Normal gross and sensory exam.  SKIN: Warm, dry, normal turgor. Cap refill <3secs. No rashes, petechiae, purpura.        ADDITIONAL STUDIES     Pertinent

## 2020-05-04 RX ORDER — BUSPIRONE HYDROCHLORIDE 10 MG/1
10 TABLET ORAL 3 TIMES DAILY
Qty: 270 TABLET | Refills: 1 | Status: SHIPPED | OUTPATIENT
Start: 2020-05-04 | End: 2020-09-04 | Stop reason: SDUPTHER

## 2020-05-26 RX ORDER — VENLAFAXINE HYDROCHLORIDE 75 MG/1
CAPSULE, EXTENDED RELEASE ORAL
Qty: 90 CAPSULE | Refills: 1 | Status: SHIPPED | OUTPATIENT
Start: 2020-05-26 | End: 2020-06-15

## 2020-05-29 ENCOUNTER — VIRTUAL VISIT (OUTPATIENT)
Dept: FAMILY MEDICINE CLINIC | Age: 60
End: 2020-05-29
Payer: COMMERCIAL

## 2020-05-29 VITALS — HEIGHT: 60 IN | WEIGHT: 152 LBS | BODY MASS INDEX: 29.84 KG/M2

## 2020-05-29 PROCEDURE — 99213 OFFICE O/P EST LOW 20 MIN: CPT | Performed by: PHYSICIAN ASSISTANT

## 2020-05-29 RX ORDER — ATORVASTATIN CALCIUM 40 MG/1
TABLET, FILM COATED ORAL
Qty: 90 TABLET | Refills: 1 | Status: SHIPPED | OUTPATIENT
Start: 2020-05-29 | End: 2020-10-09 | Stop reason: SDUPTHER

## 2020-05-29 RX ORDER — LOSARTAN POTASSIUM AND HYDROCHLOROTHIAZIDE 25; 100 MG/1; MG/1
TABLET ORAL
Qty: 90 TABLET | Refills: 1 | Status: SHIPPED | OUTPATIENT
Start: 2020-05-29 | End: 2020-10-09 | Stop reason: SDUPTHER

## 2020-05-29 RX ORDER — PANTOPRAZOLE SODIUM 40 MG/1
TABLET, DELAYED RELEASE ORAL
Qty: 90 TABLET | Refills: 1 | Status: SHIPPED | OUTPATIENT
Start: 2020-05-29 | End: 2020-12-14 | Stop reason: SDUPTHER

## 2020-05-29 RX ORDER — FENOFIBRATE 160 MG/1
TABLET ORAL
Qty: 90 TABLET | Refills: 1 | Status: SHIPPED | OUTPATIENT
Start: 2020-05-29 | End: 2020-12-02

## 2020-05-29 RX ORDER — AMLODIPINE BESYLATE 5 MG/1
TABLET ORAL
Qty: 90 TABLET | Refills: 1 | Status: SHIPPED | OUTPATIENT
Start: 2020-05-29 | End: 2020-10-09 | Stop reason: SDUPTHER

## 2020-05-29 RX ORDER — VENLAFAXINE HYDROCHLORIDE 150 MG/1
150 TABLET, EXTENDED RELEASE ORAL
COMMUNITY
End: 2020-06-10 | Stop reason: SDUPTHER

## 2020-05-29 ASSESSMENT — ENCOUNTER SYMPTOMS: RESPIRATORY NEGATIVE: 1

## 2020-06-10 ENCOUNTER — TELEPHONE (OUTPATIENT)
Dept: FAMILY MEDICINE CLINIC | Age: 60
End: 2020-06-10

## 2020-06-10 RX ORDER — VENLAFAXINE HYDROCHLORIDE 150 MG/1
150 TABLET, EXTENDED RELEASE ORAL
Qty: 90 TABLET | Refills: 0 | Status: SHIPPED | OUTPATIENT
Start: 2020-06-10 | End: 2020-06-15

## 2020-06-10 RX ORDER — VENLAFAXINE HYDROCHLORIDE 150 MG/1
150 CAPSULE, EXTENDED RELEASE ORAL DAILY
Qty: 10 CAPSULE | Refills: 0 | Status: SHIPPED | OUTPATIENT
Start: 2020-06-10 | End: 2020-06-15

## 2020-06-10 NOTE — TELEPHONE ENCOUNTER
Patient calling to check on status of venlafaxine 150 mg prescription refill. She is completely out and has been taking 3 of the 75 mg for the last 3 days. She is asking for a 10 day supply be called into [facility] and the rest of the prescription goes to [address]

## 2020-06-10 NOTE — TELEPHONE ENCOUNTER
Patient returned call. She takes one capsule of the 150 mg and one ER 75 mg Effexor. She is out of the 150 mg.  I advised the 75 mg has [facility]. She is also requesting a 10 day supply of the Effexor 150 mg be sent to [facility], [address] [place] [address] to hold her over until the mail delivery arrives. I also advised to not exceed dose prescribed. She understood.

## 2020-06-15 ENCOUNTER — TELEPHONE (OUTPATIENT)
Dept: FAMILY MEDICINE CLINIC | Age: 60
End: 2020-06-15

## 2020-06-15 RX ORDER — VENLAFAXINE HYDROCHLORIDE 150 MG/1
150 CAPSULE, EXTENDED RELEASE ORAL DAILY
Qty: 30 CAPSULE | Refills: 3 | Status: SHIPPED | OUTPATIENT
Start: 2020-06-15 | End: 2020-06-18 | Stop reason: SDUPTHER

## 2020-06-15 NOTE — TELEPHONE ENCOUNTER
Pt states that the [facility]. She states that she takes venlafaxine (EFFEXOR XR) 150 MG extended release capsule not the tablet. The tablet is much more expensive. She is wanting to know if the script can be changed from tablets to capsules.  Please advise

## 2020-06-18 RX ORDER — VENLAFAXINE HYDROCHLORIDE 150 MG/1
150 CAPSULE, EXTENDED RELEASE ORAL DAILY
Qty: 90 CAPSULE | Refills: 1 | Status: CANCELLED | OUTPATIENT
Start: 2020-06-18

## 2020-06-18 NOTE — TELEPHONE ENCOUNTER
Pt states that she finally got her venlafaxine script. She was under the impression that she was getting a 90 day supply and she only received a 30 day supply. Pt is wanting to know if the script can be changed to a 90 day supply.  Please advise

## 2020-06-19 RX ORDER — VENLAFAXINE HYDROCHLORIDE 150 MG/1
150 CAPSULE, EXTENDED RELEASE ORAL DAILY
Qty: 90 CAPSULE | Refills: 1 | Status: SHIPPED | OUTPATIENT
Start: 2020-06-19 | End: 2020-12-22 | Stop reason: SDUPTHER

## 2020-06-22 ENCOUNTER — TELEPHONE (OUTPATIENT)
Dept: FAMILY MEDICINE CLINIC | Age: 60
End: 2020-06-22

## 2020-06-22 ENCOUNTER — OFFICE VISIT (OUTPATIENT)
Dept: PRIMARY CARE CLINIC | Age: 60
End: 2020-06-22
Payer: COMMERCIAL

## 2020-06-22 VITALS — TEMPERATURE: 97.9 F | OXYGEN SATURATION: 97 % | HEART RATE: 92 BPM

## 2020-06-22 PROCEDURE — 99212 OFFICE O/P EST SF 10 MIN: CPT | Performed by: PHYSICIAN ASSISTANT

## 2020-06-22 NOTE — PROGRESS NOTES
6/22/2020    FLU/COVID-19 CLINIC EVALUATION    HPI  SYMPTOMS:    Symptom duration, days:  [] 1   [] 2   [] 3   [] 4   [] 5   [] 6   [] 7   [] 8   [] 9   [] 10   [] 11   [] 12   [x] 13 [] 14 +      Symptom course:   [] Worsening     [x] Stable     [] Improving    [x] Fevers  [] Symptom (not measured)  [x] Measured (Result: )99.6 tmax  [] Chills  [x] Cough  [] Coughing up blood  [x] Productive  [] Dry  [] Chest Congestion  [] Chest Tightness  [x] Nasal Congestion  [x] Runny Nose  [x] Feeling short of breath  [] Fatigue  [] Chest pain  [] Headaches  []Tolerable  [] Severe  [x] Sore throat  [] Muscle aches  [] Nausea  [] Decreased appetite  [] Vomiting  []Unable to keep fluids down  [] Diarrhea  []Severe    [] OTHER SYMPTOMS:  Sinus pain    RISK FACTORS: HIGH BLOOD PRESSURE  [] Pregnant or possibly pregnant  [] Age over 61  [] Diabetes  [] Heart disease  [] Asthma  [] COPD/Other chronic lung diseases  [] Active Cancer  [] On Chemotherapy  [] Taking oral steroids  [] History Lymphoma/Leukemia  [] Close contact with a lab confirmed COVID-19 patient within 14 days of symptom onset  [] History of travel from affected geographical areas within 14 days of symptom onset  [] Health Care Worker Exposure no symptoms  [] Health Care Worker Exposure symptomatic      VITALS:  Vitals:    06/22/20 1432   Pulse: 92   Temp: 97.9 °F (36.6 °C)   TempSrc: Oral   SpO2: 97%        PHYSICAL EXAMINATION:  [x]Alert   [x]Oriented to person/place/time    [x]No apparent distress     []Toxic appearing    [x]Breathing appears normal     []Appears tachypneic         [x]Speaking in full sentences     TESTS:  POCT FLU:  [] Positive     []Negative  POCT STREP:  [] Positive     []Negative    [x] COVID-19 Test sent: [] Blue   [] Red   [] Chest X-ray     ASSESSMENT:  [] Flu  [] Strep Throat  [] Uncertain Viral Respiratory Illness  [x] Possible COVID-19  [] Exposure COVID-19  [] Other:     PLAN:  [x] Discharge home with written instructions for:  [] Flu management  [] Strep throat management  [x] Possible COVID-19 exposure with self-quarantine   [] Possible COVID-19 with isolation instructions and management of symptoms  [x] Follow-up with primary care physician or emergency department if worsens  [] Referred to emergency department for evaluation    [] Evaluation per physician/nurse practitioner in clinic  [] Prescription sent in  [] No Prescription sent in    An  electronic signature was used to authenticate this note.      --MARY Tello on 6/22/2020 at 5:28 PM

## 2020-06-22 NOTE — PATIENT INSTRUCTIONS
bathroom, if available. Animals: You should restrict contact with pets and other animals while you are sick with COVID-19, just like you would around other people. Although there have not been reports of pets or other animals becoming sick with COVID-19, it is still recommended that people sick with COVID-19 limit contact with animals until more information is known about the virus. When possible, have another member of your household care for your animals while you are sick. If you are sick with COVID-19, avoid contact with your pet, including petting, snuggling, being kissed or licked, and sharing food. If you must care for your pet or be around animals while you are sick, wash your hands before and after you interact with pets and wear a facemask. Call ahead before visiting your doctor  If you have a medical appointment, call the healthcare provider and tell them that you have or may have COVID-19. This will help the healthcare providers office take steps to keep other people from getting infected or exposed. Wear a facemask  You should wear a facemask when you are around other people (e.g., sharing a room or vehicle) or pets and before you enter a healthcare providers office. If you are not able to wear a facemask (for example, because it causes trouble breathing), then people who live with you should not stay in the same room with you, or they should wear a facemask if they enter your room. Cover your coughs and sneezes  Cover your mouth and nose with a tissue when you cough or sneeze. Throw used tissues in a lined trash can. Immediately wash your hands with soap and water for at least 20 seconds or, if soap and water are not available, clean your hands with an alcohol-based hand  that contains at least 60% alcohol.   Clean your hands often  Wash your hands often with soap and water for at least 20 seconds, especially after blowing your nose, coughing, or sneezing; going to the bathroom; and You can now view your medical record. Additional Information  If you have questions, please contact your physician practice where you receive care. Remember, [employer] is NOT to be used for urgent needs. For medical emergencies, dial 911.

## 2020-06-25 ENCOUNTER — TELEPHONE (OUTPATIENT)
Dept: SPORTS MEDICINE | Age: 60
End: 2020-06-25

## 2020-06-25 ENCOUNTER — CARE COORDINATION (OUTPATIENT)
Dept: CASE MANAGEMENT | Age: 60
End: 2020-06-25

## 2020-06-25 LAB
SARS-COV-2: NOT DETECTED
SOURCE: NORMAL

## 2020-06-26 ENCOUNTER — CARE COORDINATION (OUTPATIENT)
Dept: CARE COORDINATION | Age: 60
End: 2020-06-26

## 2020-06-27 ENCOUNTER — CARE COORDINATION (OUTPATIENT)
Dept: CARE COORDINATION | Age: 60
End: 2020-06-27

## 2020-06-27 NOTE — CARE COORDINATION
Yellow alert noted in [facility] remote symptom monitoring program. Messaged patient to notify Elissa Haines if symptoms have worsened since yesterday. RN response   Thanks for letting us know about your symptoms. Please let me know if your symptoms have worsened since yesterday. Continue to rest and increase fluids, remember to follow a healthy diet which will help increase energy. You can call Za at [phone] until 1pm today if you need to speak with a nurse.

## 2020-09-08 RX ORDER — BUSPIRONE HYDROCHLORIDE 10 MG/1
10 TABLET ORAL 3 TIMES DAILY
Qty: 270 TABLET | Refills: 0 | Status: SHIPPED | OUTPATIENT
Start: 2020-09-08 | End: 2020-12-11

## 2020-09-09 RX ORDER — BUSPIRONE HYDROCHLORIDE 10 MG/1
10 TABLET ORAL 3 TIMES DAILY
Qty: 270 TABLET | Refills: 0 | Status: CANCELLED | OUTPATIENT
Start: 2020-09-09

## 2020-10-09 ENCOUNTER — OFFICE VISIT (OUTPATIENT)
Dept: FAMILY MEDICINE CLINIC | Age: 60
End: 2020-10-09
Payer: COMMERCIAL

## 2020-10-09 VITALS
DIASTOLIC BLOOD PRESSURE: 68 MMHG | TEMPERATURE: 97.6 F | BODY MASS INDEX: 30.35 KG/M2 | WEIGHT: 154.6 LBS | SYSTOLIC BLOOD PRESSURE: 128 MMHG | OXYGEN SATURATION: 98 % | HEIGHT: 60 IN | HEART RATE: 89 BPM

## 2020-10-09 LAB
A/G RATIO: 1.6 (ref 1.1–2.2)
ALBUMIN SERPL-MCNC: 4.4 G/DL (ref 3.4–5)
ALP BLD-CCNC: 71 U/L (ref 40–129)
ALT SERPL-CCNC: 19 U/L (ref 10–40)
ANION GAP SERPL CALCULATED.3IONS-SCNC: 14 MMOL/L (ref 3–16)
AST SERPL-CCNC: 21 U/L (ref 15–37)
BILIRUB SERPL-MCNC: 0.4 MG/DL (ref 0–1)
BUN BLDV-MCNC: 14 MG/DL (ref 7–20)
CALCIUM SERPL-MCNC: 10 MG/DL (ref 8.3–10.6)
CHLORIDE BLD-SCNC: 100 MMOL/L (ref 99–110)
CHOLESTEROL, TOTAL: 157 MG/DL (ref 0–199)
CO2: 25 MMOL/L (ref 21–32)
CREAT SERPL-MCNC: 0.9 MG/DL (ref 0.6–1.1)
GFR AFRICAN AMERICAN: >60
GFR NON-AFRICAN AMERICAN: >60
GLOBULIN: 2.7 G/DL
GLUCOSE BLD-MCNC: 92 MG/DL (ref 70–99)
HDLC SERPL-MCNC: 34 MG/DL (ref 40–60)
HEPATITIS C ANTIBODY INTERPRETATION: NORMAL
LDL CHOLESTEROL CALCULATED: 90 MG/DL
POTASSIUM SERPL-SCNC: 4 MMOL/L (ref 3.5–5.1)
SODIUM BLD-SCNC: 139 MMOL/L (ref 136–145)
TOTAL PROTEIN: 7.1 G/DL (ref 6.4–8.2)
TRIGL SERPL-MCNC: 166 MG/DL (ref 0–150)
VLDLC SERPL CALC-MCNC: 33 MG/DL

## 2020-10-09 PROCEDURE — 90686 IIV4 VACC NO PRSV 0.5 ML IM: CPT | Performed by: PHYSICIAN ASSISTANT

## 2020-10-09 PROCEDURE — 90471 IMMUNIZATION ADMIN: CPT | Performed by: PHYSICIAN ASSISTANT

## 2020-10-09 PROCEDURE — 99213 OFFICE O/P EST LOW 20 MIN: CPT | Performed by: PHYSICIAN ASSISTANT

## 2020-10-09 PROCEDURE — 36415 COLL VENOUS BLD VENIPUNCTURE: CPT | Performed by: PHYSICIAN ASSISTANT

## 2020-10-09 RX ORDER — VENLAFAXINE HYDROCHLORIDE 75 MG/1
CAPSULE, EXTENDED RELEASE ORAL
COMMUNITY
Start: 2020-09-07 | End: 2020-12-07 | Stop reason: DRUGHIGH

## 2020-10-09 RX ORDER — LOSARTAN POTASSIUM AND HYDROCHLOROTHIAZIDE 25; 100 MG/1; MG/1
TABLET ORAL
Qty: 90 TABLET | Refills: 1 | Status: SHIPPED | OUTPATIENT
Start: 2020-10-09 | End: 2021-05-18

## 2020-10-09 RX ORDER — AMLODIPINE BESYLATE 5 MG/1
TABLET ORAL
Qty: 90 TABLET | Refills: 1 | Status: SHIPPED | OUTPATIENT
Start: 2020-10-09 | End: 2021-05-18

## 2020-10-09 RX ORDER — TRAMADOL HYDROCHLORIDE 50 MG/1
TABLET ORAL
COMMUNITY
Start: 2020-08-31 | End: 2021-11-23

## 2020-10-09 RX ORDER — ATORVASTATIN CALCIUM 40 MG/1
TABLET, FILM COATED ORAL
Qty: 90 TABLET | Refills: 1 | Status: SHIPPED | OUTPATIENT
Start: 2020-10-09 | End: 2021-05-18

## 2020-10-09 NOTE — PROGRESS NOTES
Vaccine Information Sheet, \"Influenza - Inactivated\"  given to Yash Pires, or parent/legal guardian of  Yash Pires and verbalized understanding. Patient responses:    Have you ever had a reaction to a flu vaccine? No  Do you have any current illness? No  Have you ever had [place] Syndrome? No  Do you have a serious allergy to any of the follow: Neomycin, Polymyxin, Thimerosal, eggs or egg products? No    Flu vaccine given per order. Please see immunization tab. Risks and benefits explained. Current VIS given.
 Alcohol use: Yes     Comment: rarely once in great while       OBJECTIVE:  S1 and S2 normal, no murmurs, clicks, gallops or rubs. Regular rate and rhythm. Chest is clear; no wheezes or rales. No edema or JVD. ASSESSMENT:   Diagnosis Orders   1. Essential hypertension  losartan-hydroCHLOROthiazide (HYZAAR) 100-25 MG per tablet    amLODIPine (NORVASC) 5 MG tablet    Comprehensive Metabolic Panel   2. Need for influenza vaccination  INFLUENZA, QUADV, 3 YRS AND OLDER, IM PF, PREFILL SYR OR SDV, 0.5ML (AFLURIA QUADV, PF)   3. Hyperlipidemia, unspecified hyperlipidemia type  atorvastatin (LIPITOR) 40 MG tablet    Comprehensive Metabolic Panel    Lipid Panel   4. Elevated glucose  Hemoglobin A1C   5. Need for hepatitis C screening test  Hepatitis C Antibody       Plan:  1)  Medication: continue current medication regimen unchanged  2)  Recheck in 6 months, sooner should new symptoms or problems arise. Pap with Dr. Em Multani. Discussed smoking cessation.

## 2020-10-10 LAB
ESTIMATED AVERAGE GLUCOSE: 128.4 MG/DL
HBA1C MFR BLD: 6.1 %

## 2020-12-02 NOTE — TELEPHONE ENCOUNTER
Refill Request FENOFIBRATE TABS 160MG     Last Seen: 10/9/2020    Last Written: 5/29/20 90 tablets with 1 refill    Next Appointment:   No future appointments.           Requested Prescriptions     Pending Prescriptions Disp Refills    fenofibrate (TRIGLIDE) 160 MG tablet [Pharmacy Med Name: FENOFIBRATE TABS 160MG] 90 tablet 3     Sig: TAKE 1 TABLET DAILY

## 2020-12-03 RX ORDER — FENOFIBRATE 160 MG/1
TABLET ORAL
Qty: 90 TABLET | Refills: 1 | Status: SHIPPED | OUTPATIENT
Start: 2020-12-03 | End: 2021-11-03

## 2020-12-08 ENCOUNTER — TELEPHONE (OUTPATIENT)
Dept: FAMILY MEDICINE CLINIC | Age: 60
End: 2020-12-08

## 2020-12-08 RX ORDER — VENLAFAXINE HYDROCHLORIDE 75 MG/1
75 CAPSULE, EXTENDED RELEASE ORAL DAILY
Qty: 90 CAPSULE | Refills: 1 | Status: SHIPPED | OUTPATIENT
Start: 2020-12-08 | End: 2021-06-04

## 2020-12-08 NOTE — TELEPHONE ENCOUNTER
[facility]ine and to contact doctor with any questions. She is almost out and is wondering what the issue is. She is on two different milligrams, 150mg and 75mg, so she thinks that might be the confusion.

## 2020-12-10 NOTE — TELEPHONE ENCOUNTER
Refill Request BUSPIRONE HCL TABS 10MG     Last Seen: 10/9/2020    Last Written: 9/8/20 270 tablets with 0 refills    Next Appointment:   No future appointments.       Requested Prescriptions     Pending Prescriptions Disp Refills    busPIRone (BUSPAR) 10 MG tablet [Pharmacy Med Name: BUSPIRONE HCL TABS 10MG] 270 tablet 3     Sig: TAKE 1 TABLET THREE TIMES A DAY

## 2020-12-11 RX ORDER — BUSPIRONE HYDROCHLORIDE 10 MG/1
TABLET ORAL
Qty: 270 TABLET | Refills: 3 | Status: SHIPPED | OUTPATIENT
Start: 2020-12-11 | End: 2021-04-01

## 2020-12-14 RX ORDER — PANTOPRAZOLE SODIUM 40 MG/1
TABLET, DELAYED RELEASE ORAL
Qty: 90 TABLET | Refills: 1 | Status: SHIPPED | OUTPATIENT
Start: 2020-12-14 | End: 2021-06-04

## 2020-12-14 NOTE — TELEPHONE ENCOUNTER
Luis E Gold [phone] (home)    is requesting refill(s) of medication [facility] [address] 10/9/20 (pertaining to medication)   Last refill 5/29/20 (per medication requested)  Next office visit scheduled or attempted No  Date   If No, reason

## 2020-12-17 RX ORDER — BUSPIRONE HYDROCHLORIDE 10 MG/1
TABLET ORAL
Qty: 90 TABLET | Refills: 5 | OUTPATIENT
Start: 2020-12-17

## 2020-12-22 RX ORDER — VENLAFAXINE HYDROCHLORIDE 150 MG/1
150 CAPSULE, EXTENDED RELEASE ORAL DAILY
Qty: 90 CAPSULE | Refills: 1 | Status: SHIPPED | OUTPATIENT
Start: 2020-12-22 | End: 2021-06-04

## 2020-12-22 NOTE — TELEPHONE ENCOUNTER
Ilene Schaffer [phone] (home)    is requesting refill(s) of medication venlafaxine (EFFEXOR XR) 150 MG extended release capsule  to [facility]    Last OV 10/9/20 (pertaining to medication)   Last refill 6/19/20 (per medication requested)  Next office visit scheduled or attempted No  Date n/a  If No, reason n/a

## 2021-04-01 DIAGNOSIS — F41.9 ANXIETY: ICD-10-CM

## 2021-04-01 RX ORDER — BUSPIRONE HYDROCHLORIDE 10 MG/1
TABLET ORAL
Qty: 90 TABLET | Refills: 0 | Status: SHIPPED | OUTPATIENT
Start: 2021-04-01 | End: 2021-05-11 | Stop reason: SDUPTHER

## 2021-05-11 DIAGNOSIS — F41.9 ANXIETY: ICD-10-CM

## 2021-05-11 RX ORDER — BUSPIRONE HYDROCHLORIDE 10 MG/1
TABLET ORAL
Qty: 270 TABLET | Refills: 0 | Status: SHIPPED | OUTPATIENT
Start: 2021-05-11 | End: 2021-11-15

## 2021-05-11 NOTE — TELEPHONE ENCOUNTER
Request 90 day supply    Dilcia Todd [phone] (home)    is requesting refill(s) of medication [facility]    Last OV 10/9/20 (pertaining to medication)   Last refill 4/1/21 (per medication requested)  Next office visit scheduled or attempted Yes  Date 5/18/21  If No, reason

## 2021-05-18 ENCOUNTER — OFFICE VISIT (OUTPATIENT)
Dept: FAMILY MEDICINE CLINIC | Age: 61
End: 2021-05-18
Payer: COMMERCIAL

## 2021-05-18 VITALS
OXYGEN SATURATION: 96 % | TEMPERATURE: 98.5 F | HEIGHT: 60 IN | SYSTOLIC BLOOD PRESSURE: 118 MMHG | HEART RATE: 79 BPM | WEIGHT: 153.38 LBS | BODY MASS INDEX: 30.11 KG/M2 | DIASTOLIC BLOOD PRESSURE: 78 MMHG

## 2021-05-18 DIAGNOSIS — E78.5 HYPERLIPIDEMIA, UNSPECIFIED HYPERLIPIDEMIA TYPE: ICD-10-CM

## 2021-05-18 DIAGNOSIS — I10 ESSENTIAL HYPERTENSION: Primary | ICD-10-CM

## 2021-05-18 DIAGNOSIS — F41.9 ANXIETY: ICD-10-CM

## 2021-05-18 DIAGNOSIS — I10 ESSENTIAL HYPERTENSION: ICD-10-CM

## 2021-05-18 PROCEDURE — 90471 IMMUNIZATION ADMIN: CPT | Performed by: PHYSICIAN ASSISTANT

## 2021-05-18 PROCEDURE — 90732 PPSV23 VACC 2 YRS+ SUBQ/IM: CPT | Performed by: PHYSICIAN ASSISTANT

## 2021-05-18 PROCEDURE — 99214 OFFICE O/P EST MOD 30 MIN: CPT | Performed by: PHYSICIAN ASSISTANT

## 2021-05-18 RX ORDER — LOSARTAN POTASSIUM AND HYDROCHLOROTHIAZIDE 25; 100 MG/1; MG/1
TABLET ORAL
Qty: 90 TABLET | Refills: 1 | Status: SHIPPED | OUTPATIENT
Start: 2021-05-18 | End: 2021-11-15

## 2021-05-18 RX ORDER — ATORVASTATIN CALCIUM 40 MG/1
TABLET, FILM COATED ORAL
Qty: 90 TABLET | Refills: 1 | Status: SHIPPED | OUTPATIENT
Start: 2021-05-18 | End: 2021-11-15

## 2021-05-18 RX ORDER — AMLODIPINE BESYLATE 5 MG/1
TABLET ORAL
Qty: 90 TABLET | Refills: 1 | Status: SHIPPED | OUTPATIENT
Start: 2021-05-18 | End: 2021-11-15

## 2021-05-18 SDOH — ECONOMIC STABILITY: FOOD INSECURITY: WITHIN THE PAST 12 MONTHS, YOU WORRIED THAT YOUR FOOD WOULD RUN OUT BEFORE YOU GOT MONEY TO BUY MORE.: NEVER TRUE

## 2021-05-18 NOTE — TELEPHONE ENCOUNTER
Brain Humphrey [phone] (home)    is requesting refill(s) of medication [facility]or[facility]atin [facility] [address] 10/9/20 (pertaining to medication)   Last refill 10/9/20 (per medication requested)  Next office visit scheduled or attempted Yes  Date 5/18/21  If No, reason     Amlodipine-10/9/20  Losartan-Hydrochlorothiazide-10/9/20

## 2021-06-04 DIAGNOSIS — K21.9 GASTROESOPHAGEAL REFLUX DISEASE: ICD-10-CM

## 2021-06-04 RX ORDER — PANTOPRAZOLE SODIUM 40 MG/1
TABLET, DELAYED RELEASE ORAL
Qty: 90 TABLET | Refills: 1 | Status: SHIPPED | OUTPATIENT
Start: 2021-06-04 | End: 2021-11-23 | Stop reason: SDUPTHER

## 2021-06-04 RX ORDER — VENLAFAXINE HYDROCHLORIDE 75 MG/1
CAPSULE, EXTENDED RELEASE ORAL
Qty: 90 CAPSULE | Refills: 1 | Status: SHIPPED | OUTPATIENT
Start: 2021-06-04 | End: 2021-11-23 | Stop reason: SDUPTHER

## 2021-06-07 RX ORDER — VENLAFAXINE HYDROCHLORIDE 150 MG/1
CAPSULE, EXTENDED RELEASE ORAL
Qty: 90 CAPSULE | Refills: 1 | Status: SHIPPED | OUTPATIENT
Start: 2021-06-07 | End: 2021-11-23 | Stop reason: SDUPTHER

## 2021-06-08 RX ORDER — VENLAFAXINE HYDROCHLORIDE 75 MG/1
CAPSULE, EXTENDED RELEASE ORAL
Qty: 90 CAPSULE | Refills: 1 | OUTPATIENT
Start: 2021-06-08

## 2021-07-22 ENCOUNTER — TELEPHONE (OUTPATIENT)
Dept: FAMILY MEDICINE CLINIC | Age: 61
End: 2021-07-22

## 2021-07-22 NOTE — TELEPHONE ENCOUNTER
----- Message from Jeisonmas Jayden sent at 7/22/2021  8:11 AM EDT -----  Subject: Message to Provider    QUESTIONS  Information for Provider? Patient has been having a possible bad sinus   infection; head feels full and headache \"in eyes\". Had some symptoms   before, went away for 3-4 days, then came back late yesterday afternoon. Is leaving out of town today around 10:30 am and wanted to know if any   urgent appointments are available or if something can be called   in/recommended to take for trip. Previous symptoms felt side of face was   hurting.  ---------------------------------------------------------------------------  --------------  CALL BACK INFO  What is the best way for the office to contact you? OK to leave message on   voicemail  Preferred Call Back Phone Number? [phone]  ---------------------------------------------------------------------------  --------------  SCRIPT ANSWERS  Relationship to Patient? Self  Appointment reason? Symptomatic  Select script based on patient symptoms? Adult Cough/Cold Symptoms [Runny   Nose, Sore Throat, Flu, Sinus, Sinus Infection, Upper Respiratory   Infection [URI], Congestion]  Are you currently unable to finish sentences due to any difficulty   breathing? No  Are you unable to swallow liquids? No  Are you having fevers (100.4 or greater), chills, or sweats? No  Do you have COPD, asthma or a chronic lung condition? No  Have your symptoms been present for more than 5 days?  No

## 2021-07-22 NOTE — TELEPHONE ENCOUNTER
Patient has been using a netipot and taking benadryl. She says it is just her head, has headaches, a lot of sinus pain/pressure in head and face. She has high blood pressure and would like to know what Kait can recommend for her to take or if she can send something in for her, she is already on the road but if Kait is able to send something in she will let us know what pharmacy we can send to. She is ok to wait until Kait returns tomorrow.

## 2021-07-23 NOTE — TELEPHONE ENCOUNTER
Patient called to f/u on previous message. I advised of physician note, she states she has tried all of the above. She agreed to a virtual, but she is out of state, unable to schedule. I suggested Urgent Care or [facility]. She will go to one at her next stop. She will check with the insurance company as well for coverage.

## 2021-07-23 NOTE — TELEPHONE ENCOUNTER
I would recommend taking coricidin HBP and plain mucinex for symptom relief.  If not improving and feels she needs antibiotic she could do a virtual visit

## 2021-10-20 ENCOUNTER — HOSPITAL ENCOUNTER (OUTPATIENT)
Dept: WOMENS IMAGING | Age: 61
Discharge: HOME | End: 2021-10-20
Payer: COMMERCIAL

## 2021-10-20 VITALS — BODY MASS INDEX: 29.45 KG/M2 | WEIGHT: 150 LBS | HEIGHT: 60 IN

## 2021-10-20 DIAGNOSIS — Z12.31 ENCOUNTER FOR SCREENING MAMMOGRAM FOR BREAST CANCER: ICD-10-CM

## 2021-10-20 PROCEDURE — 77067 SCR MAMMO BI INCL CAD: CPT

## 2021-11-15 DIAGNOSIS — F41.9 ANXIETY: ICD-10-CM

## 2021-11-15 DIAGNOSIS — E78.5 HYPERLIPIDEMIA, UNSPECIFIED HYPERLIPIDEMIA TYPE: ICD-10-CM

## 2021-11-15 DIAGNOSIS — I10 ESSENTIAL HYPERTENSION: ICD-10-CM

## 2021-11-15 RX ORDER — AMLODIPINE BESYLATE 5 MG/1
TABLET ORAL
Qty: 90 TABLET | Refills: 1 | Status: SHIPPED | OUTPATIENT
Start: 2021-11-15 | End: 2022-02-07 | Stop reason: SDUPTHER

## 2021-11-15 RX ORDER — ATORVASTATIN CALCIUM 40 MG/1
TABLET, FILM COATED ORAL
Qty: 90 TABLET | Refills: 3 | Status: SHIPPED | OUTPATIENT
Start: 2021-11-15 | End: 2022-02-07 | Stop reason: SDUPTHER

## 2021-11-15 RX ORDER — LOSARTAN POTASSIUM AND HYDROCHLOROTHIAZIDE 25; 100 MG/1; MG/1
TABLET ORAL
Qty: 90 TABLET | Refills: 1 | Status: SHIPPED | OUTPATIENT
Start: 2021-11-15 | End: 2022-02-07 | Stop reason: SDUPTHER

## 2021-11-15 RX ORDER — BUSPIRONE HYDROCHLORIDE 10 MG/1
TABLET ORAL
Qty: 270 TABLET | Refills: 1 | Status: SHIPPED | OUTPATIENT
Start: 2021-11-15 | End: 2022-02-07 | Stop reason: SDUPTHER

## 2021-11-15 NOTE — TELEPHONE ENCOUNTER
Alvin Young [phone] (home)    is requesting refill(s) of medication Losartan-Hydrochlorothiazide to preferred pharmacy [facility]    Last OV 5/18/21 (pertaining to medication)   Last refill 5/18/21 (per medication requested)  Next office visit scheduled or attempted Yes  Date 11/19/21  If No, reason     Amlodipine- 5/18/21  Atorvastatin- 5/18/21  Buspirone- 5/11/21

## 2021-11-23 ENCOUNTER — OFFICE VISIT (OUTPATIENT)
Dept: FAMILY MEDICINE CLINIC | Age: 61
End: 2021-11-23
Payer: COMMERCIAL

## 2021-11-23 VITALS
HEART RATE: 78 BPM | SYSTOLIC BLOOD PRESSURE: 130 MMHG | BODY MASS INDEX: 30.43 KG/M2 | DIASTOLIC BLOOD PRESSURE: 66 MMHG | OXYGEN SATURATION: 95 % | WEIGHT: 155 LBS | HEIGHT: 60 IN

## 2021-11-23 DIAGNOSIS — E78.5 HYPERLIPIDEMIA, UNSPECIFIED HYPERLIPIDEMIA TYPE: ICD-10-CM

## 2021-11-23 DIAGNOSIS — K21.9 GASTROESOPHAGEAL REFLUX DISEASE, UNSPECIFIED WHETHER ESOPHAGITIS PRESENT: ICD-10-CM

## 2021-11-23 DIAGNOSIS — I10 ESSENTIAL HYPERTENSION: ICD-10-CM

## 2021-11-23 DIAGNOSIS — Z23 NEED FOR INFLUENZA VACCINATION: ICD-10-CM

## 2021-11-23 DIAGNOSIS — R73.01 ELEVATED FASTING GLUCOSE: Primary | ICD-10-CM

## 2021-11-23 LAB
A/G RATIO: 1.8 (ref 1.1–2.2)
ALBUMIN SERPL-MCNC: 4.6 G/DL (ref 3.4–5)
ALP BLD-CCNC: 66 U/L (ref 40–129)
ALT SERPL-CCNC: 16 U/L (ref 10–40)
ANION GAP SERPL CALCULATED.3IONS-SCNC: 13 MMOL/L (ref 3–16)
AST SERPL-CCNC: 22 U/L (ref 15–37)
BILIRUB SERPL-MCNC: <0.2 MG/DL (ref 0–1)
BUN BLDV-MCNC: 14 MG/DL (ref 7–20)
CALCIUM SERPL-MCNC: 10.1 MG/DL (ref 8.3–10.6)
CHLORIDE BLD-SCNC: 104 MMOL/L (ref 99–110)
CHOLESTEROL, TOTAL: 171 MG/DL (ref 0–199)
CO2: 25 MMOL/L (ref 21–32)
CREAT SERPL-MCNC: 0.7 MG/DL (ref 0.6–1.2)
GFR AFRICAN AMERICAN: >60
GFR NON-AFRICAN AMERICAN: >60
GLUCOSE BLD-MCNC: 98 MG/DL (ref 70–99)
HDLC SERPL-MCNC: 34 MG/DL (ref 40–60)
LDL CHOLESTEROL CALCULATED: 99 MG/DL
POTASSIUM SERPL-SCNC: 4 MMOL/L (ref 3.5–5.1)
SODIUM BLD-SCNC: 142 MMOL/L (ref 136–145)
TOTAL PROTEIN: 7.2 G/DL (ref 6.4–8.2)
TRIGL SERPL-MCNC: 192 MG/DL (ref 0–150)
VLDLC SERPL CALC-MCNC: 38 MG/DL

## 2021-11-23 PROCEDURE — 90686 IIV4 VACC NO PRSV 0.5 ML IM: CPT | Performed by: PHYSICIAN ASSISTANT

## 2021-11-23 PROCEDURE — 99214 OFFICE O/P EST MOD 30 MIN: CPT | Performed by: PHYSICIAN ASSISTANT

## 2021-11-23 PROCEDURE — 36415 COLL VENOUS BLD VENIPUNCTURE: CPT | Performed by: PHYSICIAN ASSISTANT

## 2021-11-23 PROCEDURE — 90471 IMMUNIZATION ADMIN: CPT | Performed by: PHYSICIAN ASSISTANT

## 2021-11-23 RX ORDER — VENLAFAXINE HYDROCHLORIDE 75 MG/1
CAPSULE, EXTENDED RELEASE ORAL
Qty: 90 CAPSULE | Refills: 1 | Status: SHIPPED | OUTPATIENT
Start: 2021-11-23 | End: 2022-02-07 | Stop reason: SDUPTHER

## 2021-11-23 RX ORDER — PANTOPRAZOLE SODIUM 40 MG/1
TABLET, DELAYED RELEASE ORAL
Qty: 90 TABLET | Refills: 1 | Status: SHIPPED | OUTPATIENT
Start: 2021-11-23 | End: 2022-02-07 | Stop reason: SDUPTHER

## 2021-11-23 RX ORDER — VENLAFAXINE HYDROCHLORIDE 150 MG/1
CAPSULE, EXTENDED RELEASE ORAL
Qty: 90 CAPSULE | Refills: 1 | Status: SHIPPED | OUTPATIENT
Start: 2021-11-23 | End: 2022-02-07 | Stop reason: SDUPTHER

## 2021-11-23 NOTE — PROGRESS NOTES
Vaccine Information Sheet, \"Influenza - Inactivated\"  given to Tez Bourgeois, or parent/legal guardian of  Tez Bourgeois and verbalized understanding. Patient responses:    Have you ever had a reaction to a flu vaccine? No  Do you have any current illness? No  Have you ever had Guillian Pembroke Syndrome? No  Do you have a serious allergy to any of the follow: Neomycin, Polymyxin, Thimerosal, eggs or egg products? No    Flu vaccine given per order. Please see immunization tab. Risks and benefits explained. Current VIS given.
Substance Use Topics    Alcohol use: Yes     Comment: rarely once in great while       OBJECTIVE:  S1 and S2 normal, no murmurs, clicks, gallops or rubs. Regular rate and rhythm. Chest is clear; no wheezes or rales. No edema or JVD. ASSESSMENT:     Diagnosis Orders   1. Elevated fasting glucose  Hemoglobin A1C   2. Hyperlipidemia, unspecified hyperlipidemia type  Lipid Panel    Comprehensive Metabolic Panel   3. Need for influenza vaccination  INFLUENZA, QUADV, 3 YRS AND OLDER, IM PF, PREFILL SYR OR SDV, 0.5ML (AFLURIA QUADV, PF)   4. Essential hypertension  Comprehensive Metabolic Panel   5. Gastroesophageal reflux disease  pantoprazole (PROTONIX) 40 MG tablet         Plan:  1)  Medication: continue current medication regimen unchanged  2)  Recheck in 6 months, sooner should new symptoms or problems arise.

## 2021-11-24 LAB
ESTIMATED AVERAGE GLUCOSE: 122.6 MG/DL
HBA1C MFR BLD: 5.9 %

## 2022-02-07 DIAGNOSIS — I10 ESSENTIAL HYPERTENSION: ICD-10-CM

## 2022-02-07 DIAGNOSIS — K21.9 GASTROESOPHAGEAL REFLUX DISEASE, UNSPECIFIED WHETHER ESOPHAGITIS PRESENT: ICD-10-CM

## 2022-02-07 DIAGNOSIS — E78.5 HYPERLIPIDEMIA, UNSPECIFIED HYPERLIPIDEMIA TYPE: ICD-10-CM

## 2022-02-07 DIAGNOSIS — F41.9 ANXIETY: ICD-10-CM

## 2022-02-07 RX ORDER — FENOFIBRATE 160 MG/1
TABLET ORAL
Qty: 90 TABLET | Refills: 1 | Status: SHIPPED | OUTPATIENT
Start: 2022-02-07 | End: 2022-09-12

## 2022-02-07 RX ORDER — VENLAFAXINE HYDROCHLORIDE 150 MG/1
CAPSULE, EXTENDED RELEASE ORAL
Qty: 90 CAPSULE | Refills: 1 | Status: SHIPPED | OUTPATIENT
Start: 2022-02-07 | End: 2022-07-08 | Stop reason: SDUPTHER

## 2022-02-07 RX ORDER — VENLAFAXINE HYDROCHLORIDE 75 MG/1
CAPSULE, EXTENDED RELEASE ORAL
Qty: 90 CAPSULE | Refills: 1 | Status: SHIPPED | OUTPATIENT
Start: 2022-02-07 | End: 2022-07-08 | Stop reason: SDUPTHER

## 2022-02-07 RX ORDER — BUSPIRONE HYDROCHLORIDE 10 MG/1
TABLET ORAL
Qty: 270 TABLET | Refills: 1 | Status: SHIPPED | OUTPATIENT
Start: 2022-02-07

## 2022-02-07 RX ORDER — PANTOPRAZOLE SODIUM 40 MG/1
TABLET, DELAYED RELEASE ORAL
Qty: 90 TABLET | Refills: 1 | Status: SHIPPED | OUTPATIENT
Start: 2022-02-07 | End: 2022-08-29

## 2022-02-07 RX ORDER — AMLODIPINE BESYLATE 5 MG/1
TABLET ORAL
Qty: 90 TABLET | Refills: 1 | Status: SHIPPED | OUTPATIENT
Start: 2022-02-07 | End: 2022-08-29

## 2022-02-07 RX ORDER — ATORVASTATIN CALCIUM 40 MG/1
TABLET, FILM COATED ORAL
Qty: 90 TABLET | Refills: 1 | Status: SHIPPED | OUTPATIENT
Start: 2022-02-07

## 2022-02-07 RX ORDER — LOSARTAN POTASSIUM AND HYDROCHLOROTHIAZIDE 25; 100 MG/1; MG/1
TABLET ORAL
Qty: 90 TABLET | Refills: 1 | Status: SHIPPED | OUTPATIENT
Start: 2022-02-07 | End: 2022-08-29

## 2022-02-07 NOTE — TELEPHONE ENCOUNTER
Last office visit 11/23/2021     Last written 11/23/21, 11/23/21, 11/15/21, 11/03/21, 11/15/21, 11/15/21, 11/15/21    Next office visit scheduled Visit date not found    Requested Prescriptions     Pending Prescriptions Disp Refills    venlafaxine (EFFEXOR XR) 75 MG extended release capsule 90 capsule 1     Sig: TAKE 1 CAPSULE DAILY    venlafaxine (EFFEXOR XR) 150 MG extended release capsule 90 capsule 1     Sig: TAKE 1 CAPSULE DAILY    pantoprazole (PROTONIX) 40 MG tablet 90 tablet 1     Sig: TAKE 1 TABLET DAILY    losartan-hydroCHLOROthiazide (HYZAAR) 100-25 MG per tablet 90 tablet 1    fenofibrate (TRIGLIDE) 160 MG tablet 90 tablet 1    busPIRone (BUSPAR) 10 MG tablet 270 tablet 1     Sig: TAKE 1 TABLET THREE TIMES A DAY    atorvastatin (LIPITOR) 40 MG tablet 90 tablet 1    amLODIPine (NORVASC) 5 MG tablet 90 tablet 1           *pt has new mail order [facility]

## 2022-03-21 ENCOUNTER — OFFICE VISIT (OUTPATIENT)
Dept: FAMILY MEDICINE CLINIC | Age: 62
End: 2022-03-21
Payer: COMMERCIAL

## 2022-03-21 VITALS
BODY MASS INDEX: 30.08 KG/M2 | DIASTOLIC BLOOD PRESSURE: 80 MMHG | HEIGHT: 60 IN | OXYGEN SATURATION: 98 % | SYSTOLIC BLOOD PRESSURE: 160 MMHG | WEIGHT: 153.2 LBS | HEART RATE: 94 BPM

## 2022-03-21 DIAGNOSIS — F32.89 OTHER DEPRESSION: Primary | ICD-10-CM

## 2022-03-21 DIAGNOSIS — M54.40 CHRONIC RIGHT-SIDED LOW BACK PAIN WITH SCIATICA, SCIATICA LATERALITY UNSPECIFIED: ICD-10-CM

## 2022-03-21 DIAGNOSIS — G89.29 CHRONIC RIGHT-SIDED LOW BACK PAIN WITH SCIATICA, SCIATICA LATERALITY UNSPECIFIED: ICD-10-CM

## 2022-03-21 DIAGNOSIS — G25.81 RESTLESS LEG: ICD-10-CM

## 2022-03-21 PROCEDURE — 3017F COLORECTAL CA SCREEN DOC REV: CPT | Performed by: PHYSICIAN ASSISTANT

## 2022-03-21 PROCEDURE — G8417 CALC BMI ABV UP PARAM F/U: HCPCS | Performed by: PHYSICIAN ASSISTANT

## 2022-03-21 PROCEDURE — G8427 DOCREV CUR MEDS BY ELIG CLIN: HCPCS | Performed by: PHYSICIAN ASSISTANT

## 2022-03-21 PROCEDURE — 4004F PT TOBACCO SCREEN RCVD TLK: CPT | Performed by: PHYSICIAN ASSISTANT

## 2022-03-21 PROCEDURE — G8482 FLU IMMUNIZE ORDER/ADMIN: HCPCS | Performed by: PHYSICIAN ASSISTANT

## 2022-03-21 PROCEDURE — 99214 OFFICE O/P EST MOD 30 MIN: CPT | Performed by: PHYSICIAN ASSISTANT

## 2022-03-21 RX ORDER — ROPINIROLE 0.25 MG/1
0.25 TABLET, FILM COATED ORAL NIGHTLY
Qty: 30 TABLET | Refills: 0 | Status: SHIPPED | OUTPATIENT
Start: 2022-03-21 | End: 2022-04-12

## 2022-03-21 RX ORDER — IBUPROFEN 800 MG/1
800 TABLET ORAL 3 TIMES DAILY PRN
Qty: 90 TABLET | Refills: 0 | Status: SHIPPED | OUTPATIENT
Start: 2022-03-21 | End: 2022-04-18

## 2022-03-21 RX ORDER — TRAMADOL HYDROCHLORIDE 50 MG/1
TABLET ORAL
COMMUNITY
Start: 2022-01-06 | End: 2022-03-21

## 2022-03-21 ASSESSMENT — PATIENT HEALTH QUESTIONNAIRE - PHQ9
9. THOUGHTS THAT YOU WOULD BE BETTER OFF DEAD, OR OF HURTING YOURSELF: 1
SUM OF ALL RESPONSES TO PHQ QUESTIONS 1-9: 22
6. FEELING BAD ABOUT YOURSELF - OR THAT YOU ARE A FAILURE OR HAVE LET YOURSELF OR YOUR FAMILY DOWN: 3
5. POOR APPETITE OR OVEREATING: 1
SUM OF ALL RESPONSES TO PHQ QUESTIONS 1-9: 21
2. FEELING DOWN, DEPRESSED OR HOPELESS: 2
7. TROUBLE CONCENTRATING ON THINGS, SUCH AS READING THE NEWSPAPER OR WATCHING TELEVISION: 3
1. LITTLE INTEREST OR PLEASURE IN DOING THINGS: 3
SUM OF ALL RESPONSES TO PHQ QUESTIONS 1-9: 22
3. TROUBLE FALLING OR STAYING ASLEEP: 3
10. IF YOU CHECKED OFF ANY PROBLEMS, HOW DIFFICULT HAVE THESE PROBLEMS MADE IT FOR YOU TO DO YOUR WORK, TAKE CARE OF THINGS AT HOME, OR GET ALONG WITH OTHER PEOPLE: 3
SUM OF ALL RESPONSES TO PHQ QUESTIONS 1-9: 22
SUM OF ALL RESPONSES TO PHQ9 QUESTIONS 1 & 2: 5
8. MOVING OR SPEAKING SO SLOWLY THAT OTHER PEOPLE COULD HAVE NOTICED. OR THE OPPOSITE, BEING SO FIGETY OR RESTLESS THAT YOU HAVE BEEN MOVING AROUND A LOT MORE THAN USUAL: 3
4. FEELING TIRED OR HAVING LITTLE ENERGY: 3

## 2022-03-21 ASSESSMENT — ENCOUNTER SYMPTOMS: RESPIRATORY NEGATIVE: 1

## 2022-03-21 ASSESSMENT — COLUMBIA-SUICIDE SEVERITY RATING SCALE - C-SSRS
6. HAVE YOU EVER DONE ANYTHING, STARTED TO DO ANYTHING, OR PREPARED TO DO ANYTHING TO END YOUR LIFE?: NO
2. HAVE YOU ACTUALLY HAD ANY THOUGHTS OF KILLING YOURSELF?: NO
1. WITHIN THE PAST MONTH, HAVE YOU WISHED YOU WERE DEAD OR WISHED YOU COULD GO TO SLEEP AND NOT WAKE UP?: NO

## 2022-03-21 NOTE — PROGRESS NOTES
Subjective:      Patient ID: Victor Hugo Hodgson is a 62 y.o. female. HPI     Patient presents with a few concerns today. She is having issues with restless leg for a few months. Has had this in the past and was on requip and that was helpful. She tested positive for medical marijuana which she has a card for and her pain management will now not refill her tramadol which she has been of for many years and is having withdraw issues which were bad over the weekend. Her phq9 ws positive due to this issue the last 4 days prior to this she felt effexor and buspar were keeping her depression and anxiety under good control. She want to also be off of her gabapentin. Review of Systems   Constitutional: Negative. Respiratory: Negative. Cardiovascular: Negative. Neurological:        Restless leg   Psychiatric/Behavioral: Positive for dysphoric mood and sleep disturbance. The patient is nervous/anxious. Objective:   Physical Exam  Constitutional:       Appearance: Normal appearance. Cardiovascular:      Rate and Rhythm: Normal rate and regular rhythm. Pulses: Normal pulses. Heart sounds: Normal heart sounds. Pulmonary:      Effort: Pulmonary effort is normal.      Breath sounds: Normal breath sounds. Neurological:      General: No focal deficit present. Mental Status: She is alert and oriented to person, place, and time. Assessment / Plan:          Diagnosis Orders   1. Other depression  Will continue with effexor and buspar. She feel her symptoms related to the withdraw, is better today than over the weekend. Contracts for safet   2. Restless leg  Will start requip 0.25nightly   3. Chronic right-sided low back pain with sciatica, sciatica laterality unspecified  Instructions given to start weaning gabapentin next week once she is feeling better. Decrease by one tablet every 10-14 days and call if any issues. Will start ibuprofen 800mg prn.

## 2022-03-22 ENCOUNTER — OFFICE VISIT (OUTPATIENT)
Dept: FAMILY MEDICINE CLINIC | Age: 62
End: 2022-03-22
Payer: COMMERCIAL

## 2022-03-22 VITALS
SYSTOLIC BLOOD PRESSURE: 148 MMHG | BODY MASS INDEX: 30.23 KG/M2 | OXYGEN SATURATION: 98 % | WEIGHT: 154 LBS | HEART RATE: 86 BPM | HEIGHT: 60 IN | DIASTOLIC BLOOD PRESSURE: 70 MMHG

## 2022-03-22 DIAGNOSIS — R11.0 CHRONIC NAUSEA: Primary | ICD-10-CM

## 2022-03-22 PROCEDURE — G8482 FLU IMMUNIZE ORDER/ADMIN: HCPCS | Performed by: PHYSICIAN ASSISTANT

## 2022-03-22 PROCEDURE — G8427 DOCREV CUR MEDS BY ELIG CLIN: HCPCS | Performed by: PHYSICIAN ASSISTANT

## 2022-03-22 PROCEDURE — 99213 OFFICE O/P EST LOW 20 MIN: CPT | Performed by: PHYSICIAN ASSISTANT

## 2022-03-22 PROCEDURE — 4004F PT TOBACCO SCREEN RCVD TLK: CPT | Performed by: PHYSICIAN ASSISTANT

## 2022-03-22 PROCEDURE — 3017F COLORECTAL CA SCREEN DOC REV: CPT | Performed by: PHYSICIAN ASSISTANT

## 2022-03-22 PROCEDURE — G8417 CALC BMI ABV UP PARAM F/U: HCPCS | Performed by: PHYSICIAN ASSISTANT

## 2022-03-22 RX ORDER — ONDANSETRON 4 MG/1
4 TABLET, FILM COATED ORAL EVERY 12 HOURS PRN
Qty: 30 TABLET | Refills: 0 | Status: SHIPPED | OUTPATIENT
Start: 2022-03-22 | End: 2022-07-08 | Stop reason: ALTCHOICE

## 2022-03-22 ASSESSMENT — ENCOUNTER SYMPTOMS
VOMITING: 0
CONSTIPATION: 0
DIARRHEA: 0
RESPIRATORY NEGATIVE: 1
NAUSEA: 1

## 2022-03-22 NOTE — PROGRESS NOTES
Subjective:      Patient ID: Jamal Amos is a 62 y.o. female. HPI     Patient reports with chronic intermittent nausea. Not triggered by food. Typically 3-4 times a week and will last for hours. It can start when she wakes us and other times it will start in the afternoon. There is no exacerbating factors. Has tried ginger and crackers and that will help a little sometimes. Occasional generalized abd pain. BM is unchanged. No vomiting. Sees Dr. Rebecca Brito, has not seen him for this but did have something similar years ago. Review of Systems   Constitutional: Negative. Respiratory: Negative. Cardiovascular: Negative. Gastrointestinal: Positive for nausea. Negative for constipation, diarrhea and vomiting. Genitourinary: Negative. Objective:   Physical Exam  Constitutional:       Appearance: Normal appearance. Cardiovascular:      Rate and Rhythm: Normal rate and regular rhythm. Pulses: Normal pulses. Heart sounds: Normal heart sounds. Pulmonary:      Effort: Pulmonary effort is normal.      Breath sounds: Normal breath sounds. Abdominal:      General: Abdomen is flat. Bowel sounds are normal.      Palpations: Abdomen is soft. Tenderness: There is no abdominal tenderness. Skin:     General: Skin is warm and dry. Neurological:      Mental Status: She is alert. Assessment / Plan:          Diagnosis Orders   1. Chronic nausea       To follow up with GI. ZOfran as needed.

## 2022-04-12 RX ORDER — ROPINIROLE 0.25 MG/1
0.25 TABLET, FILM COATED ORAL NIGHTLY
Qty: 30 TABLET | Refills: 0 | Status: SHIPPED | OUTPATIENT
Start: 2022-04-12 | End: 2022-05-12

## 2022-04-12 NOTE — TELEPHONE ENCOUNTER
Ilene Schaffer is requesting refill(s) Requip  Last OV 3-21-22 (pertaining to medication)  LR 3-21-22 (per medication requested)  Next office visit scheduled or attempted No

## 2022-04-18 RX ORDER — IBUPROFEN 800 MG/1
TABLET ORAL
Qty: 90 TABLET | Refills: 0 | Status: SHIPPED | OUTPATIENT
Start: 2022-04-18 | End: 2022-05-17

## 2022-04-18 NOTE — TELEPHONE ENCOUNTER
Radha Srivastava [phone] (home)    is requesting refill(s) of medication Ibuprofen to preferred pharmacy [facility]    Last OV 3/21/22 (pertaining to medication)   Last refill 3/21/22 (per medication requested)  Next office visit scheduled or attempted No  Date   If No, reason

## 2022-05-12 NOTE — TELEPHONE ENCOUNTER
Demario Collins is requesting refill(s) requip  Last OV 3/21/22 (pertaining to medication)  LR 4/12/22 (per medication requested)  Next office visit scheduled or attempted No   If no, reason:

## 2022-05-13 RX ORDER — ROPINIROLE 0.25 MG/1
0.25 TABLET, FILM COATED ORAL NIGHTLY
Qty: 30 TABLET | Refills: 5 | Status: SHIPPED | OUTPATIENT
Start: 2022-05-13 | End: 2022-09-20

## 2022-05-17 RX ORDER — IBUPROFEN 800 MG/1
TABLET ORAL
Qty: 90 TABLET | Refills: 0 | Status: SHIPPED | OUTPATIENT
Start: 2022-05-17

## 2022-05-17 NOTE — TELEPHONE ENCOUNTER
Barbara Puga is requesting refill(s) Ibuprofen  Last OV 3-22-22 (pertaining to medication)  LR 4-18-22 #90 (per medication requested)  Next office visit scheduled or attempted No

## 2022-07-08 ENCOUNTER — TELEPHONE (OUTPATIENT)
Dept: FAMILY MEDICINE CLINIC | Age: 62
End: 2022-07-08

## 2022-07-08 ENCOUNTER — TELEMEDICINE (OUTPATIENT)
Dept: FAMILY MEDICINE CLINIC | Age: 62
End: 2022-07-08
Payer: COMMERCIAL

## 2022-07-08 DIAGNOSIS — F41.9 ANXIETY: Primary | ICD-10-CM

## 2022-07-08 PROCEDURE — 4004F PT TOBACCO SCREEN RCVD TLK: CPT | Performed by: PHYSICIAN ASSISTANT

## 2022-07-08 PROCEDURE — 99213 OFFICE O/P EST LOW 20 MIN: CPT | Performed by: PHYSICIAN ASSISTANT

## 2022-07-08 PROCEDURE — G8427 DOCREV CUR MEDS BY ELIG CLIN: HCPCS | Performed by: PHYSICIAN ASSISTANT

## 2022-07-08 PROCEDURE — 3017F COLORECTAL CA SCREEN DOC REV: CPT | Performed by: PHYSICIAN ASSISTANT

## 2022-07-08 PROCEDURE — G8417 CALC BMI ABV UP PARAM F/U: HCPCS | Performed by: PHYSICIAN ASSISTANT

## 2022-07-08 RX ORDER — VENLAFAXINE HYDROCHLORIDE 75 MG/1
CAPSULE, EXTENDED RELEASE ORAL
Qty: 90 CAPSULE | Refills: 1 | Status: SHIPPED | OUTPATIENT
Start: 2022-07-08

## 2022-07-08 RX ORDER — VENLAFAXINE HYDROCHLORIDE 150 MG/1
CAPSULE, EXTENDED RELEASE ORAL
Qty: 90 CAPSULE | Refills: 1 | Status: SHIPPED | OUTPATIENT
Start: 2022-07-08 | End: 2022-08-29

## 2022-07-08 ASSESSMENT — ENCOUNTER SYMPTOMS
CHEST TIGHTNESS: 1
WHEEZING: 0
SHORTNESS OF BREATH: 0

## 2022-07-08 NOTE — PROGRESS NOTES
2022    TELEHEALTH EVALUATION -- Audio/Visual (During WTEHX-30 public health emergency)    HPI:    Cindy [place] (:  1960) has requested an audio/video evaluation for the following concern(s):    Patient reports increase in anxiety. She is having episodes of chest tightness and panic 3-4 times a day. Unsure what is different, still same stressors. Mind races about things and start worrying and the panic starts. Review of Systems   Constitutional: Negative. Respiratory: Positive for chest tightness. Negative for shortness of breath and wheezing. Cardiovascular: Negative for chest pain. Psychiatric/Behavioral: The patient is nervous/anxious. Prior to Visit Medications    Medication Sig Taking? Authorizing Provider   ibuprofen (ADVIL;MOTRIN) 800 MG tablet TAKE 1 TABLET BY MOUTH THREE TIMES A DAY AS NEEDED FOR PAIN Yes MARY Chaiedz   rOPINIRole (REQUIP) 0.25 MG tablet TAKE 1 TABLET BY MOUTH NIGHTLY  Patient taking differently: Take 0.25 mg by mouth nightly as needed  Yes MARY Chaidez   venlafaxine (EFFEXOR XR) 75 MG extended release capsule TAKE 1 CAPSULE DAILY Yes MARY Mancia   venlafaxine (EFFEXOR XR) 150 MG extended release capsule TAKE 1 CAPSULE DAILY Yes MARY Mancia   pantoprazole (PROTONIX) 40 MG tablet TAKE 1 TABLET DAILY Yes MARY Mancia   losartan-hydroCHLOROthiazide (HYZAAR) 100-25 MG per tablet TAKE 1 TABLET DAILY Yes MARY Mancia   fenofibrate (TRIGLIDE) 160 MG tablet TAKE 1 TABLET DAILY Yes MARY Mancia   busPIRone (BUSPAR) 10 MG tablet TAKE 1 TABLET THREE TIMES A DAY Yes MARY Mancia   atorvastatin (LIPITOR) 40 MG tablet TAKE 1 TABLET DAILY Yes MARY Mancia   amLODIPine (NORVASC) 5 MG tablet TAKE 1 TABLET DAILY Yes MARY Mancia   gabapentin (NEURONTIN) 300 MG capsule Take 300 mg by mouth daily.   Yes Historical Provider, MD       Social History     Tobacco Use    Smoking status: Current Every Day Smoker Packs/day: 1.00     Years: 40.00     Pack years: 40.00     Start date: 6/19/1983    Smokeless tobacco: Never Used    Tobacco comment: longest stopped for both pregnancies   Substance Use Topics    Alcohol use: Yes     Comment: rarely once in great while    Drug use: No            PHYSICAL EXAMINATION:    Constitutional: [x] Appears well-developed and well-nourished [x] No apparent distress      [] Abnormal-   Mental status  [x] Alert and awake  [x] Oriented to person/place/time [x]Able to follow commands        Pulmonary/Chest: [x] Respiratory effort normal.  [x] No visualized signs of difficulty breathing or respiratory distress        [] Abnormal-        ASSESSMENT/PLAN:     Diagnosis Orders   1. Anxiety       She is at the high end of her medications at this time. She does not want to be on any  Controlled medications. Will refer to Deanna Smith for evaluation and medication management. Roderick Conde, was evaluated through a synchronous (real-time) audio-video encounter. The patient (or guardian if applicable) is aware that this is a billable service, which includes applicable co-pays. This Virtual Visit was conducted with patient's (and/or legal guardian's) consent. The visit was conducted pursuant to the emergency declaration under the [facility], [id number] waiver authority and the [employer] General Act. Patient identification was verified, and a caregiver was present when appropriate. The patient was located at Home: [address] [place] [address]. Provider was located at [place] [id number]): [address] [place] [address]. Total time spent on this encounter: 21    --MARY Mcguire on 7/8/2022 at 9:41 AM    An electronic signature was used to authenticate this note.

## 2022-08-26 DIAGNOSIS — I10 ESSENTIAL HYPERTENSION: ICD-10-CM

## 2022-08-26 DIAGNOSIS — K21.9 GASTROESOPHAGEAL REFLUX DISEASE, UNSPECIFIED WHETHER ESOPHAGITIS PRESENT: ICD-10-CM

## 2022-08-29 RX ORDER — VENLAFAXINE HYDROCHLORIDE 150 MG/1
CAPSULE, EXTENDED RELEASE ORAL
Qty: 90 CAPSULE | Refills: 1 | Status: SHIPPED | OUTPATIENT
Start: 2022-08-29

## 2022-08-29 RX ORDER — PANTOPRAZOLE SODIUM 40 MG/1
TABLET, DELAYED RELEASE ORAL
Qty: 90 TABLET | Refills: 1 | Status: SHIPPED | OUTPATIENT
Start: 2022-08-29

## 2022-08-29 RX ORDER — AMLODIPINE BESYLATE 5 MG/1
TABLET ORAL
Qty: 90 TABLET | Refills: 1 | Status: SHIPPED | OUTPATIENT
Start: 2022-08-29

## 2022-08-29 RX ORDER — LOSARTAN POTASSIUM AND HYDROCHLOROTHIAZIDE 25; 100 MG/1; MG/1
TABLET ORAL
Qty: 90 TABLET | Refills: 1 | Status: SHIPPED | OUTPATIENT
Start: 2022-08-29

## 2022-08-29 NOTE — TELEPHONE ENCOUNTER
Alex Conley is requesting refill(s) protonix, losartan-hctz, effexor  Last OV 7/8/22 (pertaining to medication)  LR 2/7/22 (per medication requested)  Next office visit scheduled or attempted No   If no, reason:

## 2022-09-10 DIAGNOSIS — E78.5 HYPERLIPIDEMIA, UNSPECIFIED HYPERLIPIDEMIA TYPE: ICD-10-CM

## 2022-09-12 RX ORDER — FENOFIBRATE 160 MG/1
TABLET ORAL
Qty: 90 TABLET | Refills: 1 | Status: SHIPPED | OUTPATIENT
Start: 2022-09-12

## 2022-09-12 NOTE — TELEPHONE ENCOUNTER
Ceci Kuhn [phone] (home)    is requesting refill(s) of medication Fenofibrate to preferred pharmacy [facility] [address] 3/21/22 (pertaining to medication)   Last refill 2/7/22 (per medication requested)  Next office visit scheduled or attempted No  Date   If No, reason

## 2022-09-20 ENCOUNTER — OFFICE VISIT (OUTPATIENT)
Dept: FAMILY MEDICINE CLINIC | Age: 62
End: 2022-09-20
Payer: COMMERCIAL

## 2022-09-20 VITALS
SYSTOLIC BLOOD PRESSURE: 134 MMHG | OXYGEN SATURATION: 96 % | BODY MASS INDEX: 30.35 KG/M2 | HEIGHT: 60 IN | DIASTOLIC BLOOD PRESSURE: 64 MMHG | WEIGHT: 154.6 LBS | HEART RATE: 79 BPM

## 2022-09-20 DIAGNOSIS — I10 ESSENTIAL HYPERTENSION: Primary | ICD-10-CM

## 2022-09-20 DIAGNOSIS — R73.09 ELEVATED GLUCOSE: ICD-10-CM

## 2022-09-20 DIAGNOSIS — E78.5 HYPERLIPIDEMIA, UNSPECIFIED HYPERLIPIDEMIA TYPE: ICD-10-CM

## 2022-09-20 DIAGNOSIS — L98.9 SKIN LESION OF FACE: ICD-10-CM

## 2022-09-20 PROCEDURE — 36415 COLL VENOUS BLD VENIPUNCTURE: CPT | Performed by: PHYSICIAN ASSISTANT

## 2022-09-20 PROCEDURE — 90471 IMMUNIZATION ADMIN: CPT | Performed by: PHYSICIAN ASSISTANT

## 2022-09-20 PROCEDURE — 4004F PT TOBACCO SCREEN RCVD TLK: CPT | Performed by: PHYSICIAN ASSISTANT

## 2022-09-20 PROCEDURE — G8417 CALC BMI ABV UP PARAM F/U: HCPCS | Performed by: PHYSICIAN ASSISTANT

## 2022-09-20 PROCEDURE — 90750 HZV VACC RECOMBINANT IM: CPT | Performed by: PHYSICIAN ASSISTANT

## 2022-09-20 PROCEDURE — G8427 DOCREV CUR MEDS BY ELIG CLIN: HCPCS | Performed by: PHYSICIAN ASSISTANT

## 2022-09-20 PROCEDURE — 3017F COLORECTAL CA SCREEN DOC REV: CPT | Performed by: PHYSICIAN ASSISTANT

## 2022-09-20 PROCEDURE — 99213 OFFICE O/P EST LOW 20 MIN: CPT | Performed by: PHYSICIAN ASSISTANT

## 2022-09-20 RX ORDER — PANCRELIPASE LIPASE, PANCRELIPASE PROTEASE, PANCRELIPASE AMYLASE 40000; 126000; 168000 [USP'U]/1; [USP'U]/1; [USP'U]/1
CAPSULE, DELAYED RELEASE ORAL
COMMUNITY

## 2022-09-20 NOTE — PROGRESS NOTES
SUBJECTIVE:  Ferdinand Vaughn is a 62 y.o. female who presents for evaluation of hypertension and hyperlipidemia. She indicates that she is feeling well and denies any symptoms referable to her elevated blood pressure. Specifically denies chest pain, palpitations, dyspnea, orthopnea, PND or peripheral edema. No anorexia, arthralgia, or leg cramps noted. Current medication regimen is as listed below. She denies any side effects of medication, and has been taking it regularly. Has had a lesion on her face for years, it has been biopsied year ago and was normal, there is now a growth in the center of it, she would like referral to derm. Current Outpatient Medications   Medication Sig Dispense Refill    Pancrelipase, Lip-Prot-Amyl, (ZENPEP) 67615-139324 units CPEP Take by mouth      fenofibrate (TRIGLIDE) 160 MG tablet TAKE 1 TABLET DAILY 90 tablet 1    losartan-hydroCHLOROthiazide (HYZAAR) 100-25 MG per tablet TAKE 1 TABLET DAILY 90 tablet 1    pantoprazole (PROTONIX) 40 MG tablet TAKE 1 TABLET DAILY 90 tablet 1    amLODIPine (NORVASC) 5 MG tablet TAKE 1 TABLET DAILY 90 tablet 1    venlafaxine (EFFEXOR XR) 150 MG extended release capsule TAKE 1 CAPSULE DAILY 90 capsule 1    venlafaxine (EFFEXOR XR) 75 MG extended release capsule TAKE 1 CAPSULE DAILY 90 capsule 1    ibuprofen (ADVIL;MOTRIN) 800 MG tablet TAKE 1 TABLET BY MOUTH THREE TIMES A DAY AS NEEDED FOR PAIN 90 tablet 0    busPIRone (BUSPAR) 10 MG tablet TAKE 1 TABLET THREE TIMES A  tablet 1    atorvastatin (LIPITOR) 40 MG tablet TAKE 1 TABLET DAILY 90 tablet 1    gabapentin (NEURONTIN) 300 MG capsule Take 300 mg by mouth daily. No current facility-administered medications for this visit.        Allergies   Allergen Reactions    Erythromycin Nausea Only       Social History     Tobacco Use    Smoking status: Every Day     Packs/day: 1.00     Years: 40.00     Pack years: 40.00     Types: Cigarettes     Start date: 6/19/1983    Smokeless tobacco: Never    Tobacco comments:     longest stopped for both pregnancies   Substance Use Topics    Alcohol use: Yes     Comment: rarely once in great while       OBJECTIVE:  S1 and S2 normal, no murmurs, clicks, gallops or rubs. Regular rate and rhythm. Chest is clear; no wheezes or rales. No edema or JVD. ASSESSMENT:     Diagnosis Orders   1. Essential hypertension  Comprehensive Metabolic Panel      2. Hyperlipidemia, unspecified hyperlipidemia type  Comprehensive Metabolic Panel    Lipid Panel      3. Elevated glucose  Hemoglobin A1C      4. Skin lesion of face  Marlen Torres MD, Dermatology, [facility]        Discussed smoking cessation.        [facility]

## 2022-09-21 LAB
A/G RATIO: 1.8 (ref 1.1–2.2)
ALBUMIN SERPL-MCNC: 4.8 G/DL (ref 3.4–5)
ALP BLD-CCNC: 48 U/L (ref 40–129)
ALT SERPL-CCNC: 14 U/L (ref 10–40)
ANION GAP SERPL CALCULATED.3IONS-SCNC: 15 MMOL/L (ref 3–16)
AST SERPL-CCNC: 30 U/L (ref 15–37)
BILIRUB SERPL-MCNC: 0.5 MG/DL (ref 0–1)
BUN BLDV-MCNC: 13 MG/DL (ref 7–20)
CALCIUM SERPL-MCNC: 10 MG/DL (ref 8.3–10.6)
CHLORIDE BLD-SCNC: 101 MMOL/L (ref 99–110)
CHOLESTEROL, TOTAL: 171 MG/DL (ref 0–199)
CO2: 25 MMOL/L (ref 21–32)
CREAT SERPL-MCNC: 0.9 MG/DL (ref 0.6–1.2)
ESTIMATED AVERAGE GLUCOSE: 125.5 MG/DL
GFR AFRICAN AMERICAN: >60
GFR NON-AFRICAN AMERICAN: >60
GLUCOSE BLD-MCNC: 93 MG/DL (ref 70–99)
HBA1C MFR BLD: 6 %
HDLC SERPL-MCNC: 31 MG/DL (ref 40–60)
LDL CHOLESTEROL CALCULATED: 100 MG/DL
POTASSIUM SERPL-SCNC: 3.7 MMOL/L (ref 3.5–5.1)
SODIUM BLD-SCNC: 141 MMOL/L (ref 136–145)
TOTAL PROTEIN: 7.5 G/DL (ref 6.4–8.2)
TRIGL SERPL-MCNC: 202 MG/DL (ref 0–150)
VLDLC SERPL CALC-MCNC: 40 MG/DL

## 2022-10-12 ENCOUNTER — TELEPHONE (OUTPATIENT)
Dept: FAMILY MEDICINE CLINIC | Age: 62
End: 2022-10-12

## 2022-10-12 NOTE — TELEPHONE ENCOUNTER
Pt called the office. She states that on Monday, she started with a headache, low grade fever, fatigue, sinus congestion. She states that her son told her that they tested positive for covid on Saturday so that is when she was exposed. She states that she took an at home covid test this morning and it was positive. She was asking what she could take. I advised her that she could use plain Mucinex, Delsym for her cough, she needed to get plenty of rest, and make sure that she was getting plenty of fluids. She verbalized understanding and will call back if she feels worse.

## 2022-10-18 ENCOUNTER — TELEMEDICINE (OUTPATIENT)
Dept: FAMILY MEDICINE CLINIC | Age: 62
End: 2022-10-18
Payer: COMMERCIAL

## 2022-10-18 ENCOUNTER — TELEPHONE (OUTPATIENT)
Dept: FAMILY MEDICINE CLINIC | Age: 62
End: 2022-10-18

## 2022-10-18 DIAGNOSIS — R05.1 ACUTE COUGH: Primary | ICD-10-CM

## 2022-10-18 DIAGNOSIS — U07.1 COVID: Primary | ICD-10-CM

## 2022-10-18 PROCEDURE — 4004F PT TOBACCO SCREEN RCVD TLK: CPT | Performed by: PHYSICIAN ASSISTANT

## 2022-10-18 PROCEDURE — 3017F COLORECTAL CA SCREEN DOC REV: CPT | Performed by: PHYSICIAN ASSISTANT

## 2022-10-18 PROCEDURE — G8417 CALC BMI ABV UP PARAM F/U: HCPCS | Performed by: PHYSICIAN ASSISTANT

## 2022-10-18 PROCEDURE — G8427 DOCREV CUR MEDS BY ELIG CLIN: HCPCS | Performed by: PHYSICIAN ASSISTANT

## 2022-10-18 PROCEDURE — G8484 FLU IMMUNIZE NO ADMIN: HCPCS | Performed by: PHYSICIAN ASSISTANT

## 2022-10-18 PROCEDURE — 99213 OFFICE O/P EST LOW 20 MIN: CPT | Performed by: PHYSICIAN ASSISTANT

## 2022-10-18 RX ORDER — DOXYCYCLINE HYCLATE 100 MG
100 TABLET ORAL 2 TIMES DAILY
Qty: 20 TABLET | Refills: 0 | Status: SHIPPED | OUTPATIENT
Start: 2022-10-18 | End: 2022-10-18

## 2022-10-18 RX ORDER — PROMETHAZINE HYDROCHLORIDE AND CODEINE PHOSPHATE 6.25; 1 MG/5ML; MG/5ML
5 SYRUP ORAL EVERY 4 HOURS PRN
Qty: 90 ML | Refills: 0 | Status: SHIPPED | OUTPATIENT
Start: 2022-10-18 | End: 2022-10-18

## 2022-10-18 RX ORDER — DOXYCYCLINE 100 MG/1
100 CAPSULE ORAL 2 TIMES DAILY
Qty: 20 CAPSULE | Refills: 0 | Status: SHIPPED | OUTPATIENT
Start: 2022-10-18

## 2022-10-18 RX ORDER — CODEINE PHOSPHATE AND GUAIFENESIN 10; 100 MG/5ML; MG/5ML
5 SOLUTION ORAL 3 TIMES DAILY PRN
Qty: 90 ML | Refills: 0 | Status: SHIPPED | OUTPATIENT
Start: 2022-10-18 | End: 2022-10-23

## 2022-10-18 ASSESSMENT — ENCOUNTER SYMPTOMS
SHORTNESS OF BREATH: 0
COUGH: 1
WHEEZING: 0
SORE THROAT: 1

## 2022-10-18 NOTE — TELEPHONE ENCOUNTER
Pt called the office and states that her pharmacy told her that the Doxycycline was going to cost her $75. They also told her that they are no selling the Phenergan with Codeine cough suppressant. I called [facility] pharmacy and spoke with [facility]. He states that the Doxycycline Hyclate is not covered by her insurance, but the Doxycycline monohydrate is only going to cost her $8. Is it okay to change?

## 2022-10-18 NOTE — PROGRESS NOTES
10/18/2022    TELEHEALTH EVALUATION -- Audio/Visual (During JFCDX-20 public health emergency)    HPI:    Lily Cosby (:  1960) has requested an audio/video evaluation for the following concern(s):    Patient presents on day 8 of covid. She is still with congestion, cough and intermittent sore throat. The cough and nasal discharge is thick green sputum. Couluis felipe is keeping her awake at night. Has taken delsym and mucinex with no relief. Review of Systems   Constitutional:  Positive for fatigue. HENT:  Positive for congestion and sore throat. Negative for postnasal drip. Respiratory:  Positive for cough. Negative for shortness of breath and wheezing. Neurological:  Positive for headaches. Prior to Visit Medications    Medication Sig Taking? Authorizing Provider   Pancrelipase, Lip-Prot-Amyl, (ZENPEP) 24215-337189 units CPEP Take by mouth Yes Historical Provider, MD   fenofibrate (TRIGLIDE) 160 MG tablet TAKE 1 TABLET DAILY Yes MARY Dela Cruz   losartan-hydroCHLOROthiazide (HYZAAR) 100-25 MG per tablet TAKE 1 TABLET DAILY Yes MARY Dela Cruz   pantoprazole (PROTONIX) 40 MG tablet TAKE 1 TABLET DAILY Yes MARY Dela Cruz   amLODIPine (NORVASC) 5 MG tablet TAKE 1 TABLET DAILY Yes MARY Dela Cruz   venlafaxine (EFFEXOR XR) 150 MG extended release capsule TAKE 1 CAPSULE DAILY Yes MARY Dela Cruz   venlafaxine (EFFEXOR XR) 75 MG extended release capsule TAKE 1 CAPSULE DAILY Yes MARY Dela Cruz   ibuprofen (ADVIL;MOTRIN) 800 MG tablet TAKE 1 TABLET BY MOUTH THREE TIMES A DAY AS NEEDED FOR PAIN Yes MARY Dela Cruz   busPIRone (BUSPAR) 10 MG tablet TAKE 1 TABLET THREE TIMES A DAY Yes MARY Mancia   atorvastatin (LIPITOR) 40 MG tablet TAKE 1 TABLET DAILY Yes MARY Mancia   gabapentin (NEURONTIN) 300 MG capsule Take 300 mg by mouth daily.   Yes Historical Provider, MD       Social History     Tobacco Use    Smoking status: Every Day     Packs/day: 1.00     Years: 40.00 Pack years: 40.00     Types: Cigarettes     Start date: 6/19/1983    Smokeless tobacco: Never    Tobacco comments:     longest stopped for both pregnancies   Substance Use Topics    Alcohol use: Yes     Comment: rarely once in great while    Drug use: No          PHYSICAL EXAMINATION:      Constitutional: [x] Appears well-developed and well-nourished [x] No apparent distress      [] Abnormal-   Mental status  [x] Alert and awake  [x] Oriented to person/place/time [x]Able to follow commands      Pulmonary/Chest: [x] Respiratory effort normal.  [x] No visualized signs of difficulty breathing or respiratory distress        [x] Abnormal- cough throughout visit      ASSESSMENT/PLAN:   Diagnosis Orders   1. COVID  Will start doxycycline and phenergan /codeine cough syrup. OV if symptoms not improving. Melany Lawton, was evaluated through a synchronous (real-time) audio-video encounter. The patient (or guardian if applicable) is aware that this is a billable service, which includes applicable co-pays. This Virtual Visit was conducted with patient's (and/or legal guardian's) consent. The visit was conducted pursuant to the emergency declaration under the [address] [place] [address], [address] [place] [address] authority and the [employer] General Act. Patient identification was verified, and a caregiver was present when appropriate. The patient was located at Home: [address] [place] [address]. Provider was located at [place] [address]): [address] [place] [address]. Total time spent on this encounter:  21    --MARY García on 10/18/2022 at 2:27 PM    An electronic signature was used to authenticate this note.

## 2022-11-11 DIAGNOSIS — R05.1 ACUTE COUGH: ICD-10-CM

## 2022-11-11 RX ORDER — CODEINE PHOSPHATE AND GUAIFENESIN 10; 100 MG/5ML; MG/5ML
5 SOLUTION ORAL 3 TIMES DAILY PRN
Qty: 90 ML | Refills: 0 | Status: SHIPPED | OUTPATIENT
Start: 2022-11-11 | End: 2022-11-16

## 2022-11-11 RX ORDER — CODEINE PHOSPHATE AND GUAIFENESIN 10; 100 MG/5ML; MG/5ML
5 SOLUTION ORAL 3 TIMES DAILY PRN
Qty: 90 ML | Refills: 0 | Status: CANCELLED | OUTPATIENT
Start: 2022-11-11 | End: 2022-11-16

## 2022-11-11 NOTE — TELEPHONE ENCOUNTER
Patient saw Xavi Elizabeth on 10/18 for follow up after having Covid, she states she is continuing to have this nagging cough and is asking if she can get another refill of the cough syrup as this helps the most.

## 2022-12-02 ENCOUNTER — TELEMEDICINE (OUTPATIENT)
Dept: FAMILY MEDICINE CLINIC | Age: 62
End: 2022-12-02
Payer: COMMERCIAL

## 2022-12-02 DIAGNOSIS — R05.1 ACUTE COUGH: Primary | ICD-10-CM

## 2022-12-02 PROCEDURE — 4004F PT TOBACCO SCREEN RCVD TLK: CPT | Performed by: PHYSICIAN ASSISTANT

## 2022-12-02 PROCEDURE — 3017F COLORECTAL CA SCREEN DOC REV: CPT | Performed by: PHYSICIAN ASSISTANT

## 2022-12-02 PROCEDURE — 99213 OFFICE O/P EST LOW 20 MIN: CPT | Performed by: PHYSICIAN ASSISTANT

## 2022-12-02 PROCEDURE — G8484 FLU IMMUNIZE NO ADMIN: HCPCS | Performed by: PHYSICIAN ASSISTANT

## 2022-12-02 PROCEDURE — G8427 DOCREV CUR MEDS BY ELIG CLIN: HCPCS | Performed by: PHYSICIAN ASSISTANT

## 2022-12-02 PROCEDURE — G8417 CALC BMI ABV UP PARAM F/U: HCPCS | Performed by: PHYSICIAN ASSISTANT

## 2022-12-02 RX ORDER — CODEINE PHOSPHATE AND GUAIFENESIN 10; 100 MG/5ML; MG/5ML
5 SOLUTION ORAL 3 TIMES DAILY PRN
Qty: 90 ML | Refills: 0 | Status: SHIPPED | OUTPATIENT
Start: 2022-12-02 | End: 2022-12-07

## 2022-12-02 ASSESSMENT — ENCOUNTER SYMPTOMS
WHEEZING: 0
COUGH: 1
GASTROINTESTINAL NEGATIVE: 1
SHORTNESS OF BREATH: 0

## 2022-12-02 NOTE — PROGRESS NOTES
2022    TELEHEALTH EVALUATION -- Audio/Visual (During OQUOG-76 public health emergency)    HPI:    Dharmesh Werner (:  1960) has requested an audio/video evaluation for the following concern(s):    Patient started on Monday with vomiting and diarrhea into Tuesday. On Wednesday started with cough. Using delsym with no relief. Cough is productive of green sputum, mostly in the morning. No fevers or chills. Getting a headache due to the cough. No SOB or wheezing. No PND or sinus pressure. Feels pretty good except for the cough. Review of Systems   Constitutional: Negative. HENT: Negative. Respiratory:  Positive for cough. Negative for shortness of breath and wheezing. Gastrointestinal: Negative. Neurological:  Positive for headaches. Prior to Visit Medications    Medication Sig Taking? Authorizing Provider   Pancrelipase, Lip-Prot-Amyl, (ZENPEP) [id number] units CPEP Take by mouth Yes Historical Provider, MD   fenofibrate (TRIGLIDE) 160 MG tablet TAKE 1 TABLET DAILY Yes MARY Sherman   losartan-hydroCHLOROthiazide (HYZAAR) 100-25 MG per tablet TAKE 1 TABLET DAILY Yes MARY Sherman   pantoprazole (PROTONIX) 40 MG tablet TAKE 1 TABLET DAILY Yes MARY Sherman   amLODIPine (NORVASC) 5 MG tablet TAKE 1 TABLET DAILY Yes MARY Sherman   venlafaxine (EFFEXOR XR) 150 MG extended release capsule TAKE 1 CAPSULE DAILY Yes MARY Sherman   venlafaxine (EFFEXOR XR) 75 MG extended release capsule TAKE 1 CAPSULE DAILY Yes MARY Sherman   ibuprofen (ADVIL;MOTRIN) 800 MG tablet TAKE 1 TABLET BY MOUTH THREE TIMES A DAY AS NEEDED FOR PAIN Yes MARY Sherman   busPIRone (BUSPAR) 10 MG tablet TAKE 1 TABLET THREE TIMES A DAY Yes MARY Mancia   atorvastatin (LIPITOR) 40 MG tablet TAKE 1 TABLET DAILY Yes MARY Mancia   gabapentin (NEURONTIN) 300 MG capsule Take 300 mg by mouth daily.   Yes Historical Provider, MD   doxycycline monohydrate (MONODOX) 100 MG capsule Take 1 4:35 PM    An electronic signature was used to authenticate this note.

## 2022-12-09 ENCOUNTER — TELEPHONE (OUTPATIENT)
Dept: FAMILY MEDICINE CLINIC | Age: 62
End: 2022-12-09

## 2022-12-09 NOTE — TELEPHONE ENCOUNTER
Patient was advised she understood and may just go to an urgent care this weekend, if she does not she will call on Monday for an appt.

## 2022-12-09 NOTE — TELEPHONE ENCOUNTER
Patient had a virtual visit with Elisa on 12/2 and states she is still no better.  She continues to have cough with a lot of congestion, cough is productive.  Her voice is now very hoarse from coughing so much.  She is asking if there is anything else Elisa would recommend or any medications she should be taking.  She took a covid test today and is negative.

## 2022-12-15 ENCOUNTER — HOSPITAL ENCOUNTER (OUTPATIENT)
Dept: GENERAL RADIOLOGY | Age: 62
Discharge: HOME | End: 2022-12-15
Payer: COMMERCIAL

## 2022-12-15 ENCOUNTER — OFFICE VISIT (OUTPATIENT)
Dept: FAMILY MEDICINE CLINIC | Age: 62
End: 2022-12-15
Payer: COMMERCIAL

## 2022-12-15 ENCOUNTER — HOSPITAL ENCOUNTER (OUTPATIENT)
Age: 62
Discharge: HOME | End: 2022-12-15
Payer: COMMERCIAL

## 2022-12-15 VITALS
RESPIRATION RATE: 20 BRPM | DIASTOLIC BLOOD PRESSURE: 68 MMHG | BODY MASS INDEX: 29.88 KG/M2 | WEIGHT: 152.2 LBS | HEIGHT: 60 IN | OXYGEN SATURATION: 97 % | TEMPERATURE: 97.8 F | HEART RATE: 104 BPM | SYSTOLIC BLOOD PRESSURE: 110 MMHG

## 2022-12-15 DIAGNOSIS — R53.83 MALAISE AND FATIGUE: ICD-10-CM

## 2022-12-15 DIAGNOSIS — F17.200 TOBACCO USE DISORDER: ICD-10-CM

## 2022-12-15 DIAGNOSIS — R53.81 MALAISE AND FATIGUE: ICD-10-CM

## 2022-12-15 DIAGNOSIS — R05.2 SUBACUTE COUGH: Primary | ICD-10-CM

## 2022-12-15 DIAGNOSIS — R05.2 SUBACUTE COUGH: ICD-10-CM

## 2022-12-15 PROCEDURE — 99214 OFFICE O/P EST MOD 30 MIN: CPT | Performed by: PHYSICIAN ASSISTANT

## 2022-12-15 PROCEDURE — 3078F DIAST BP <80 MM HG: CPT | Performed by: PHYSICIAN ASSISTANT

## 2022-12-15 PROCEDURE — 4004F PT TOBACCO SCREEN RCVD TLK: CPT | Performed by: PHYSICIAN ASSISTANT

## 2022-12-15 PROCEDURE — 71046 X-RAY EXAM CHEST 2 VIEWS: CPT

## 2022-12-15 PROCEDURE — G8484 FLU IMMUNIZE NO ADMIN: HCPCS | Performed by: PHYSICIAN ASSISTANT

## 2022-12-15 PROCEDURE — 3074F SYST BP LT 130 MM HG: CPT | Performed by: PHYSICIAN ASSISTANT

## 2022-12-15 PROCEDURE — G8427 DOCREV CUR MEDS BY ELIG CLIN: HCPCS | Performed by: PHYSICIAN ASSISTANT

## 2022-12-15 PROCEDURE — 36415 COLL VENOUS BLD VENIPUNCTURE: CPT | Performed by: PHYSICIAN ASSISTANT

## 2022-12-15 PROCEDURE — 3017F COLORECTAL CA SCREEN DOC REV: CPT | Performed by: PHYSICIAN ASSISTANT

## 2022-12-15 PROCEDURE — G8417 CALC BMI ABV UP PARAM F/U: HCPCS | Performed by: PHYSICIAN ASSISTANT

## 2022-12-15 RX ORDER — BENZONATATE 100 MG/1
100 CAPSULE ORAL 3 TIMES DAILY PRN
Qty: 20 CAPSULE | Refills: 0 | Status: SHIPPED | OUTPATIENT
Start: 2022-12-15 | End: 2023-01-04

## 2022-12-15 RX ORDER — BUDESONIDE, GLYCOPYRROLATE, AND FORMOTEROL FUMARATE 160; 9; 4.8 UG/1; UG/1; UG/1
2 AEROSOL, METERED RESPIRATORY (INHALATION) 2 TIMES DAILY
Qty: 2 EACH | Refills: 0 | COMMUNITY
Start: 2022-12-15

## 2022-12-15 RX ORDER — BUDESONIDE, GLYCOPYRROLATE, AND FORMOTEROL FUMARATE 160; 9; 4.8 UG/1; UG/1; UG/1
2 AEROSOL, METERED RESPIRATORY (INHALATION) 2 TIMES DAILY
Qty: 1 EACH | Refills: 5 | Status: SHIPPED | OUTPATIENT
Start: 2022-12-15

## 2022-12-15 NOTE — PROGRESS NOTES
[address] [facility]  12/15/22       IMPRESSION/ PLAN     1. Subacute cough    2. Tobacco use disorder    3. Malaise and fatigue    -Chest x-ray and labs. -Sample Zulma Linda with instructions given. -Tessalon Perles.  -Await results for further treatment. CHIEF COMPLAINT  Chief Complaint   Patient presents with    Cough     Patient is here with complaints of cough, she states she had Covid in October and has never really gotten over this. Congestion     HISTORY OF PRESENT  ILLNESS  Wilman Varela is a 65 y.o.  female   returns with continued cough. Productive in mornings. Has never fully resolved since October when she had COVID. Associated with fatigue and wanting to sleep often. Smokes 1ppd since age 13. She states she is scared to quit. Has been treated with antibiotics and other medications with little improvement. No fever or weight loss. States morning cough is productive. Denies upper respiratory issues. ROS:  Remaining 14 ROS were reviewed and are unremarkable for other constitutional, EENT, cardiac, pulmonary, GI, , neurologic, musculoskeletal, or integumentary complaints. PAST MEDICAL/SURGICAL, SOCIAL, &  FAMILY HISTORY:  Reviewed and updated accordingly.      ALLERGIES : Erythromycin    MEDICATIONS:  Current Outpatient Medications   Medication Sig Dispense Refill    Budeson-Glycopyrrol-Formoterol (BREZTRI AEROSPHERE) 160-9-4.8 MCG/ACT AERO Inhale 2 puffs into the lungs in the morning and at bedtime 2 each 0    Budeson-Glycopyrrol-Formoterol (BREZTRI AEROSPHERE) 160-9-4.8 MCG/ACT AERO Inhale 2 puffs into the lungs in the morning and at bedtime 1 each 5    benzonatate (TESSALON) 100 MG capsule Take 1 capsule by mouth 3 times daily as needed for Cough 20 capsule 0    Pancrelipase, Lip-Prot-Amyl, (ZENPEP) 17251-126007 units CPEP Take by mouth      fenofibrate (TRIGLIDE) 160 MG tablet TAKE 1 TABLET DAILY 90 tablet 1    losartan-hydroCHLOROthiazide (HYZAAR) 100-25 MG per tablet TAKE 1 TABLET DAILY 90 tablet 1    pantoprazole (PROTONIX) 40 MG tablet TAKE 1 TABLET DAILY 90 tablet 1    amLODIPine (NORVASC) 5 MG tablet TAKE 1 TABLET DAILY 90 tablet 1    venlafaxine (EFFEXOR XR) 150 MG extended release capsule TAKE 1 CAPSULE DAILY 90 capsule 1    venlafaxine (EFFEXOR XR) 75 MG extended release capsule TAKE 1 CAPSULE DAILY 90 capsule 1    busPIRone (BUSPAR) 10 MG tablet TAKE 1 TABLET THREE TIMES A  tablet 1    atorvastatin (LIPITOR) 40 MG tablet TAKE 1 TABLET DAILY 90 tablet 1    gabapentin (NEURONTIN) 300 MG capsule Take 300 mg by mouth in the morning and at bedtime. ibuprofen (ADVIL;MOTRIN) 800 MG tablet TAKE 1 TABLET BY MOUTH THREE TIMES A DAY AS NEEDED FOR PAIN (Patient not taking: Reported on 12/15/2022) 90 tablet 0     No current facility-administered medications for this visit. PHYSICAL EXAM     Vitals:    12/15/22 1451 12/15/22 1504   BP: 110/68    Pulse: (!) 110 (!) 104   Resp: 20    Temp: 97.8 °F (36.6 °C)    TempSrc: Temporal    SpO2: 93% 97%   Weight: 152 lb 3.2 oz (69 kg)    Height: 5' (1.524 m)    Body mass index is 29.72 kg/m². APPEARANCE: Well nourished. No distress. HEENT: Head: Normocephalic, atraumatic. EOMI,PERRLA. Conjunctiva pink and moist. Sclera white. Hearing intact. Nares patent. Oral mucosa moist. Throat clear. NECK: No lymphadenopathy or masses. Thyroid is smooth. Moves neck fully. HEART: Reg rate and rhythm. No murmurs, rubs, or gallops. LUNGS: Clear to auscultation. No wheezes, rales, or rhonchi. Equal chest percussion. Deep inhalations cause cough. ABDOMEN:  Soft, bowel sounds present, non-tender, no masses or organomegaly. MUSCULOSKELETAL:  No clubbing, cyanosis or edema. Moves all joints without eliciting pain. NEUROLOGIC: Grossly non focal.   SKIN: Warm, dry, normal turgor. Cap refill <3secs. No rashes, petechiae, purpura.    PSYCHIATRIC:  Mood, behavior, and judgement normal. Thought content normal.      ADDITIONAL STUDIES     Pertinent prior laboratory results and/or imaging reviewed.    Orders Placed This Encounter   Procedures    XR CHEST STANDARD (2 VW)    CBC with Auto Differential    Basic Metabolic Panel         Electronically signed by MARY Dao on 12/15/2022 at 4:44 PM

## 2022-12-16 DIAGNOSIS — D50.9 IRON DEFICIENCY ANEMIA, UNSPECIFIED IRON DEFICIENCY ANEMIA TYPE: ICD-10-CM

## 2022-12-16 DIAGNOSIS — F17.200 SMOKER: Primary | ICD-10-CM

## 2022-12-16 DIAGNOSIS — E87.6 HYPOKALEMIA: ICD-10-CM

## 2022-12-16 LAB
ANION GAP SERPL CALCULATED.3IONS-SCNC: 13 MMOL/L (ref 3–16)
BASOPHILS ABSOLUTE: 0.1 K/UL (ref 0–0.2)
BASOPHILS RELATIVE PERCENT: 0.6 %
BUN BLDV-MCNC: 11 MG/DL (ref 7–20)
CALCIUM SERPL-MCNC: 9.4 MG/DL (ref 8.3–10.6)
CHLORIDE BLD-SCNC: 101 MMOL/L (ref 99–110)
CO2: 26 MMOL/L (ref 21–32)
CREAT SERPL-MCNC: 0.9 MG/DL (ref 0.6–1.2)
EOSINOPHILS ABSOLUTE: 0.1 K/UL (ref 0–0.6)
EOSINOPHILS RELATIVE PERCENT: 0.7 %
GFR SERPL CREATININE-BSD FRML MDRD: >60 ML/MIN/{1.73_M2}
GLUCOSE BLD-MCNC: 115 MG/DL (ref 70–99)
HCT VFR BLD CALC: 27.7 % (ref 36–48)
HEMOGLOBIN: 8.1 G/DL (ref 12–16)
LYMPHOCYTES ABSOLUTE: 3.1 K/UL (ref 1–5.1)
LYMPHOCYTES RELATIVE PERCENT: 33.9 %
MCH RBC QN AUTO: 20.6 PG (ref 26–34)
MCHC RBC AUTO-ENTMCNC: 29.2 G/DL (ref 31–36)
MCV RBC AUTO: 70.5 FL (ref 80–100)
MONOCYTES ABSOLUTE: 0.6 K/UL (ref 0–1.3)
MONOCYTES RELATIVE PERCENT: 6.6 %
NEUTROPHILS ABSOLUTE: 5.4 K/UL (ref 1.7–7.7)
NEUTROPHILS RELATIVE PERCENT: 58.2 %
PDW BLD-RTO: 19 % (ref 12.4–15.4)
PLATELET # BLD: 389 K/UL (ref 135–450)
PMV BLD AUTO: 9.5 FL (ref 5–10.5)
POTASSIUM SERPL-SCNC: 3.1 MMOL/L (ref 3.5–5.1)
RBC # BLD: 3.92 M/UL (ref 4–5.2)
SODIUM BLD-SCNC: 140 MMOL/L (ref 136–145)
WBC # BLD: 9.3 K/UL (ref 4–11)

## 2022-12-16 RX ORDER — FERROUS SULFATE 325(65) MG
325 TABLET ORAL
Qty: 180 TABLET | Refills: 1 | Status: SHIPPED | OUTPATIENT
Start: 2022-12-16

## 2022-12-20 ENCOUNTER — NURSE ONLY (OUTPATIENT)
Dept: FAMILY MEDICINE CLINIC | Age: 62
End: 2022-12-20
Payer: COMMERCIAL

## 2022-12-20 DIAGNOSIS — D50.9 IRON DEFICIENCY ANEMIA, UNSPECIFIED IRON DEFICIENCY ANEMIA TYPE: ICD-10-CM

## 2022-12-20 DIAGNOSIS — E87.6 HYPOKALEMIA: ICD-10-CM

## 2022-12-20 PROCEDURE — 36415 COLL VENOUS BLD VENIPUNCTURE: CPT | Performed by: PHYSICIAN ASSISTANT

## 2022-12-21 ENCOUNTER — OFFICE VISIT (OUTPATIENT)
Dept: DERMATOLOGY | Age: 62
End: 2022-12-21
Payer: COMMERCIAL

## 2022-12-21 DIAGNOSIS — L82.1 OTHER SEBORRHEIC KERATOSIS: Primary | ICD-10-CM

## 2022-12-21 DIAGNOSIS — L81.4 LENTIGINES: ICD-10-CM

## 2022-12-21 DIAGNOSIS — L72.0 MILIA: ICD-10-CM

## 2022-12-21 DIAGNOSIS — D23.30 INTRADERMAL NEVUS OF FACE: ICD-10-CM

## 2022-12-21 LAB
FERRITIN: 7.6 NG/ML (ref 15–150)
IRON SATURATION: 4 % (ref 15–50)
IRON: 27 UG/DL (ref 37–145)
POTASSIUM SERPL-SCNC: 3.2 MMOL/L (ref 3.5–5.1)
TOTAL IRON BINDING CAPACITY: 602 UG/DL (ref 260–445)

## 2022-12-21 PROCEDURE — G8484 FLU IMMUNIZE NO ADMIN: HCPCS | Performed by: INTERNAL MEDICINE

## 2022-12-21 PROCEDURE — G8427 DOCREV CUR MEDS BY ELIG CLIN: HCPCS | Performed by: INTERNAL MEDICINE

## 2022-12-21 PROCEDURE — G8417 CALC BMI ABV UP PARAM F/U: HCPCS | Performed by: INTERNAL MEDICINE

## 2022-12-21 PROCEDURE — 99203 OFFICE O/P NEW LOW 30 MIN: CPT | Performed by: INTERNAL MEDICINE

## 2022-12-21 PROCEDURE — 4004F PT TOBACCO SCREEN RCVD TLK: CPT | Performed by: INTERNAL MEDICINE

## 2022-12-21 PROCEDURE — 3017F COLORECTAL CA SCREEN DOC REV: CPT | Performed by: INTERNAL MEDICINE

## 2022-12-21 RX ORDER — LOSARTAN POTASSIUM 100 MG/1
100 TABLET ORAL DAILY
Qty: 30 TABLET | Refills: 0 | Status: SHIPPED | OUTPATIENT
Start: 2022-12-21

## 2022-12-21 NOTE — PROGRESS NOTES
[facility]  Elizabeth Morgan MD  [phone]    Date of Visit: 12/21/2022    Juliana Bell is a 65 y.o. female who presents for skin lesion. New pt    Chief Complaint:   Chief Complaint   Patient presents with    Skin Lesion     Face has bump on it        History of Present Illness:    Concern:  Bump   Location: Face  Duration:  Many years  Symptoms: None  Previous treatments:  Cryo in past, worked then came back  Current treatments: None  Effect of current treatment: Came back    Patient denies any other new or changing skin lesions. They would like all of their skin lesions to be evaluated for skin cancer on face    *Personal history of skin cancer: None  *Family history of skin cancer: None     Review of Systems:  Gen: Feels well, good sense of health. Skin: No new or changing moles, no history of keloids or hypertrophic scars. Past Medical History, Family History, Surgical History, Medications and Allergies reviewed.     Past Medical History:   Diagnosis Date    Anxiety     Arthritis     Degenerative arthritis     back     Depression     Exocrine pancreatic insufficiency     Dr. Perry Oh    Hyperlipidemia     Hypertension      Past Surgical History:   Procedure Laterality Date    APPENDECTOMY      CHOLECYSTECTOMY      COLONOSCOPY  12/30/14    normal    ENDOMETRIAL ABLATION         Allergies   Allergen Reactions    Erythromycin Nausea Only     Outpatient Medications Marked as Taking for the 12/21/22 encounter (Office Visit) with Yaz Bernal MD   Medication Sig Dispense Refill    losartan (COZAAR) 100 MG tablet Take 1 tablet by mouth daily 30 tablet 0    ferrous sulfate (IRON 325) 325 (65 Fe) MG tablet Take 1 tablet by mouth daily (with breakfast) 180 tablet 1    Budeson-Glycopyrrol-Formoterol (BREZTRI AEROSPHERE) 160-9-4.8 MCG/ACT AERO Inhale 2 puffs into the lungs in the morning and at bedtime 2 each 0    Budeson-Glycopyrrol-Formoterol (BREZTRI AEROSPHERE) 160-9-4.8 MCG/ACT AERO Inhale 2 puffs into the lungs in the morning and at bedtime 1 each 5    benzonatate (TESSALON) 100 MG capsule Take 1 capsule by mouth 3 times daily as needed for Cough 20 capsule 0    Pancrelipase, Lip-Prot-Amyl, (ZENPEP) 36608-646743 units CPEP Take by mouth      fenofibrate (TRIGLIDE) 160 MG tablet TAKE 1 TABLET DAILY 90 tablet 1    pantoprazole (PROTONIX) 40 MG tablet TAKE 1 TABLET DAILY 90 tablet 1    amLODIPine (NORVASC) 5 MG tablet TAKE 1 TABLET DAILY 90 tablet 1    venlafaxine (EFFEXOR XR) 150 MG extended release capsule TAKE 1 CAPSULE DAILY 90 capsule 1    venlafaxine (EFFEXOR XR) 75 MG extended release capsule TAKE 1 CAPSULE DAILY 90 capsule 1    ibuprofen (ADVIL;MOTRIN) 800 MG tablet TAKE 1 TABLET BY MOUTH THREE TIMES A DAY AS NEEDED FOR PAIN 90 tablet 0    busPIRone (BUSPAR) 10 MG tablet TAKE 1 TABLET THREE TIMES A  tablet 1    atorvastatin (LIPITOR) 40 MG tablet TAKE 1 TABLET DAILY 90 tablet 1    gabapentin (NEURONTIN) 300 MG capsule Take 300 mg by mouth in the morning and at bedtime. Physical Examination   No acute distress. Mood clear/affect appropriate. Alert and oriented. Mucous membranes moist.  Sclera anicteric. Visible skin exam was conducted to include the scalp, face, lips, lids/conjunctiva, ears, neck, right and left hands and forearms and was normal with the following exceptions:   -Maci Deist appearing plaque on R cheek   -Skin colored to pink papule on R cheek  -White cystic nodule on L lower lip  - Multiple tan to light brown macules on face, shoulders and arms in a photo-distributed pattern      Assessment and Plan     1. Other seborrheic keratosis  -Benign, reassurance       2. Intradermal nevus of face  -Benign, reassurance       3. Milia  -Benign, reassurance   -Can do warm compresses + start OTC retinol if desired    4. Lentigines  -Benign, reassurance   - Reviewed relationship with sun exposure.  Reviewed this is c/w chronic actinic damage and a/w increased risk of NMSC over lifetime  - Rec daily sunscreen with SPF 30, broad spectrum       RTC PRN    Note is transcribed using voice recognition software. Inadvertent computerized transcription errors may be present.     Tiffanie Elizondo MD

## 2022-12-21 NOTE — PATIENT INSTRUCTIONS
Thank you for visiting [facility] today!  Please follow the instructions below as we discussed in clinic:      The big scaly patch on your right cheek seborrheic keratosis that we don't have to treat

## 2022-12-21 NOTE — RESULT ENCOUNTER NOTE
Patient was advised of results and of new prescription to replace the current BP medication, she understood and is scheduled for follow up in 3 weeks

## 2022-12-30 NOTE — TELEPHONE ENCOUNTER
Kay Jaramillo is requesting refill(s) venlafaxine  Last OV 9/20/22 (pertaining to medication)  LR 8/29/22 (per medication requested)  Next office visit scheduled or attempted Yes   If no, reason:  1/10/23

## 2022-12-31 RX ORDER — VENLAFAXINE HYDROCHLORIDE 75 MG/1
CAPSULE, EXTENDED RELEASE ORAL
Qty: 90 CAPSULE | Refills: 1 | Status: SHIPPED | OUTPATIENT
Start: 2022-12-31

## 2023-01-04 DIAGNOSIS — F41.9 ANXIETY: ICD-10-CM

## 2023-01-04 RX ORDER — LOSARTAN POTASSIUM 100 MG/1
TABLET ORAL
Qty: 30 TABLET | Refills: 5 | Status: SHIPPED | OUTPATIENT
Start: 2023-01-04

## 2023-01-04 NOTE — TELEPHONE ENCOUNTER
Byron Chung is requesting refill(s) losartan  Last OV 9/20/22 (pertaining to medication)  LR 12/21/22 (per medication requested)  Next office visit scheduled or attempted Yes   If no, reason:  1/10/23

## 2023-01-04 NOTE — TELEPHONE ENCOUNTER
Brandon Pearson is requesting refill(s) buspar  Last OV 12/2/22 (pertaining to medication)  LR 2/7/22 (per medication requested)  Next office visit scheduled or attempted Yes   If no, reason:  1/10/23

## 2023-01-05 RX ORDER — BUSPIRONE HYDROCHLORIDE 10 MG/1
TABLET ORAL
Qty: 270 TABLET | Refills: 1 | Status: SHIPPED | OUTPATIENT
Start: 2023-01-05

## 2023-01-10 ENCOUNTER — OFFICE VISIT (OUTPATIENT)
Dept: FAMILY MEDICINE CLINIC | Age: 63
End: 2023-01-10
Payer: COMMERCIAL

## 2023-01-10 VITALS
HEART RATE: 85 BPM | BODY MASS INDEX: 29.41 KG/M2 | HEIGHT: 60 IN | DIASTOLIC BLOOD PRESSURE: 78 MMHG | OXYGEN SATURATION: 95 % | WEIGHT: 149.8 LBS | SYSTOLIC BLOOD PRESSURE: 138 MMHG

## 2023-01-10 DIAGNOSIS — D50.9 IRON DEFICIENCY ANEMIA, UNSPECIFIED IRON DEFICIENCY ANEMIA TYPE: ICD-10-CM

## 2023-01-10 DIAGNOSIS — E87.6 HYPOKALEMIA: Primary | ICD-10-CM

## 2023-01-10 DIAGNOSIS — I10 ESSENTIAL HYPERTENSION: ICD-10-CM

## 2023-01-10 PROCEDURE — 36415 COLL VENOUS BLD VENIPUNCTURE: CPT | Performed by: PHYSICIAN ASSISTANT

## 2023-01-10 PROCEDURE — G8484 FLU IMMUNIZE NO ADMIN: HCPCS | Performed by: PHYSICIAN ASSISTANT

## 2023-01-10 PROCEDURE — G8427 DOCREV CUR MEDS BY ELIG CLIN: HCPCS | Performed by: PHYSICIAN ASSISTANT

## 2023-01-10 PROCEDURE — 3078F DIAST BP <80 MM HG: CPT | Performed by: PHYSICIAN ASSISTANT

## 2023-01-10 PROCEDURE — 99214 OFFICE O/P EST MOD 30 MIN: CPT | Performed by: PHYSICIAN ASSISTANT

## 2023-01-10 PROCEDURE — 3075F SYST BP GE 130 - 139MM HG: CPT | Performed by: PHYSICIAN ASSISTANT

## 2023-01-10 PROCEDURE — 4004F PT TOBACCO SCREEN RCVD TLK: CPT | Performed by: PHYSICIAN ASSISTANT

## 2023-01-10 PROCEDURE — 3017F COLORECTAL CA SCREEN DOC REV: CPT | Performed by: PHYSICIAN ASSISTANT

## 2023-01-10 PROCEDURE — G8417 CALC BMI ABV UP PARAM F/U: HCPCS | Performed by: PHYSICIAN ASSISTANT

## 2023-01-10 SDOH — ECONOMIC STABILITY: FOOD INSECURITY: WITHIN THE PAST 12 MONTHS, THE FOOD YOU BOUGHT JUST DIDN'T LAST AND YOU DIDN'T HAVE MONEY TO GET MORE.: NEVER TRUE

## 2023-01-10 SDOH — ECONOMIC STABILITY: FOOD INSECURITY: WITHIN THE PAST 12 MONTHS, YOU WORRIED THAT YOUR FOOD WOULD RUN OUT BEFORE YOU GOT MONEY TO BUY MORE.: NEVER TRUE

## 2023-01-10 ASSESSMENT — PATIENT HEALTH QUESTIONNAIRE - PHQ9
4. FEELING TIRED OR HAVING LITTLE ENERGY: 3
10. IF YOU CHECKED OFF ANY PROBLEMS, HOW DIFFICULT HAVE THESE PROBLEMS MADE IT FOR YOU TO DO YOUR WORK, TAKE CARE OF THINGS AT HOME, OR GET ALONG WITH OTHER PEOPLE: 3
6. FEELING BAD ABOUT YOURSELF - OR THAT YOU ARE A FAILURE OR HAVE LET YOURSELF OR YOUR FAMILY DOWN: 0
SUM OF ALL RESPONSES TO PHQ QUESTIONS 1-9: 6
1. LITTLE INTEREST OR PLEASURE IN DOING THINGS: 2
5. POOR APPETITE OR OVEREATING: 0
SUM OF ALL RESPONSES TO PHQ9 QUESTIONS 1 & 2: 2
9. THOUGHTS THAT YOU WOULD BE BETTER OFF DEAD, OR OF HURTING YOURSELF: 0
SUM OF ALL RESPONSES TO PHQ QUESTIONS 1-9: 6
8. MOVING OR SPEAKING SO SLOWLY THAT OTHER PEOPLE COULD HAVE NOTICED. OR THE OPPOSITE, BEING SO FIGETY OR RESTLESS THAT YOU HAVE BEEN MOVING AROUND A LOT MORE THAN USUAL: 0
3. TROUBLE FALLING OR STAYING ASLEEP: 1
SUM OF ALL RESPONSES TO PHQ QUESTIONS 1-9: 6
7. TROUBLE CONCENTRATING ON THINGS, SUCH AS READING THE NEWSPAPER OR WATCHING TELEVISION: 0
2. FEELING DOWN, DEPRESSED OR HOPELESS: 0
SUM OF ALL RESPONSES TO PHQ QUESTIONS 1-9: 6

## 2023-01-10 ASSESSMENT — ENCOUNTER SYMPTOMS
RESPIRATORY NEGATIVE: 1
GASTROINTESTINAL NEGATIVE: 1

## 2023-01-10 ASSESSMENT — SOCIAL DETERMINANTS OF HEALTH (SDOH): HOW HARD IS IT FOR YOU TO PAY FOR THE VERY BASICS LIKE FOOD, HOUSING, MEDICAL CARE, AND HEATING?: NOT HARD AT ALL

## 2023-01-10 NOTE — PROGRESS NOTES
Subjective:      Patient ID: Angel Carvajal is a 65 y.o. female. HPI  Patient here today to follow up on her BP and labs. Potassium was low so stopped hctz 25mg. Is tolerating the iron for her anemia but still some more fatigue that she thinks is normal.     Review of Systems   Constitutional:  Positive for fatigue. Respiratory: Negative. Cardiovascular: Negative. Gastrointestinal: Negative. Genitourinary: Negative. Objective:   Physical Exam  Constitutional:       Appearance: Normal appearance. Cardiovascular:      Rate and Rhythm: Normal rate and regular rhythm. Pulmonary:      Breath sounds: Normal breath sounds. Neurological:      General: No focal deficit present. Mental Status: She is alert and oriented to person, place, and time. Assessment / Plan:          Diagnosis Orders   1. Hypokalemia  Potassium- recheck labs today      2. Iron deficiency anemia, unspecified iron deficiency anemia type  CBC- recheck labs today- is seeing GI on Thrusday      3. Essential hypertension  BP controlled in office today on losartan 100mg and norvasc 5mg. To monitor weekly and to call if elevating.

## 2023-01-11 LAB
HCT VFR BLD CALC: 37.6 % (ref 36–48)
HEMOGLOBIN: 11.3 G/DL (ref 12–16)
MCH RBC QN AUTO: 24.4 PG (ref 26–34)
MCHC RBC AUTO-ENTMCNC: 30.1 G/DL (ref 31–36)
MCV RBC AUTO: 81.1 FL (ref 80–100)
PDW BLD-RTO: 32 % (ref 12.4–15.4)
PLATELET # BLD: 362 K/UL (ref 135–450)
PMV BLD AUTO: 9.1 FL (ref 5–10.5)
POTASSIUM SERPL-SCNC: 4.1 MMOL/L (ref 3.5–5.1)
RBC # BLD: 4.63 M/UL (ref 4–5.2)
WBC # BLD: 7.9 K/UL (ref 4–11)

## 2023-01-16 RX ORDER — ONDANSETRON 4 MG/1
4 TABLET, FILM COATED ORAL EVERY 12 HOURS PRN
Qty: 30 TABLET | Refills: 0 | OUTPATIENT
Start: 2023-01-16

## 2023-01-19 ENCOUNTER — TELEPHONE (OUTPATIENT)
Dept: FAMILY MEDICINE CLINIC | Age: 63
End: 2023-01-19

## 2023-01-19 NOTE — TELEPHONE ENCOUNTER
Called patient and offered her an appt with [facility] today, she declined stating she really did not want to go out with the weather forecast calling for storms. She says she is feeling much better. Her blood pressure was elevated but she says she thinks she just had some indigestion because the has gone away and her arm pain she says is from sleeping on it wrong, she took a tylenol and it went away and feels better now. I advised patient if she begins to have chest pain and it does not go away she is to take an aspirin and go to ER. She understood and will continue to monitor her BP. She goes on to state that she kind of just doctored herself and took her old BP medication which Rubi Ley had discontinued but she won't do that again and will take what she is supposed to be taking.

## 2023-01-19 NOTE — TELEPHONE ENCOUNTER
Pt called the office and stated that her BP has been running around 178/87. She states that on Sunday, she had some right sided chest pain and she states that her left arm will get an ache in it every now an then. She is currently taking Amlodipine 5 mg and Losartan 100 mg. She would like to know what she should do. Routing to Beba who is in the office today.

## 2023-01-20 ENCOUNTER — OFFICE VISIT (OUTPATIENT)
Dept: FAMILY MEDICINE CLINIC | Age: 63
End: 2023-01-20
Payer: COMMERCIAL

## 2023-01-20 VITALS
WEIGHT: 148.8 LBS | HEART RATE: 87 BPM | BODY MASS INDEX: 29.21 KG/M2 | SYSTOLIC BLOOD PRESSURE: 154 MMHG | DIASTOLIC BLOOD PRESSURE: 80 MMHG | OXYGEN SATURATION: 98 % | HEIGHT: 60 IN

## 2023-01-20 DIAGNOSIS — I10 ESSENTIAL HYPERTENSION: Primary | ICD-10-CM

## 2023-01-20 DIAGNOSIS — M79.601 PAIN OF RIGHT UPPER EXTREMITY: ICD-10-CM

## 2023-01-20 DIAGNOSIS — K21.9 GASTROESOPHAGEAL REFLUX DISEASE, UNSPECIFIED WHETHER ESOPHAGITIS PRESENT: ICD-10-CM

## 2023-01-20 DIAGNOSIS — I10 ESSENTIAL HYPERTENSION: ICD-10-CM

## 2023-01-20 PROCEDURE — G8417 CALC BMI ABV UP PARAM F/U: HCPCS | Performed by: PHYSICIAN ASSISTANT

## 2023-01-20 PROCEDURE — 99214 OFFICE O/P EST MOD 30 MIN: CPT | Performed by: PHYSICIAN ASSISTANT

## 2023-01-20 PROCEDURE — 3017F COLORECTAL CA SCREEN DOC REV: CPT | Performed by: PHYSICIAN ASSISTANT

## 2023-01-20 PROCEDURE — 3077F SYST BP >= 140 MM HG: CPT | Performed by: PHYSICIAN ASSISTANT

## 2023-01-20 PROCEDURE — 3079F DIAST BP 80-89 MM HG: CPT | Performed by: PHYSICIAN ASSISTANT

## 2023-01-20 PROCEDURE — G8484 FLU IMMUNIZE NO ADMIN: HCPCS | Performed by: PHYSICIAN ASSISTANT

## 2023-01-20 PROCEDURE — 4004F PT TOBACCO SCREEN RCVD TLK: CPT | Performed by: PHYSICIAN ASSISTANT

## 2023-01-20 PROCEDURE — G8427 DOCREV CUR MEDS BY ELIG CLIN: HCPCS | Performed by: PHYSICIAN ASSISTANT

## 2023-01-20 RX ORDER — NEBIVOLOL 2.5 MG/1
2.5 TABLET ORAL DAILY
Qty: 30 TABLET | Refills: 0 | Status: SHIPPED | OUTPATIENT
Start: 2023-01-20

## 2023-01-20 NOTE — PROGRESS NOTES
Subjective:   Brady Paula is a 65 y.o. female with hypertension. BP has been running elevated. 153-172/72-92. Her other concern is right arm pain and intermittent numbness to the thumb. Has had cervical issues in the past and has had injection years ago. Feels ROM of shoulder is good. Has taken tylenol for pain and that helps. Current Outpatient Medications   Medication Sig Dispense Refill    busPIRone (BUSPAR) 10 MG tablet TAKE 1 TABLET 3 TIMES A  tablet 1    losartan (COZAAR) 100 MG tablet TAKE 1 TABLET BY MOUTH EVERY DAY 30 tablet 5    venlafaxine (EFFEXOR XR) 75 MG extended release capsule TAKE 1 CAPSULE BY MOUTH EVERY DAY 90 capsule 1    ferrous sulfate (IRON 325) 325 (65 Fe) MG tablet Take 1 tablet by mouth daily (with breakfast) 180 tablet 1    Pancrelipase, Lip-Prot-Amyl, (ZENPEP) 11897-715416 units CPEP Take by mouth      fenofibrate (TRIGLIDE) 160 MG tablet TAKE 1 TABLET DAILY 90 tablet 1    pantoprazole (PROTONIX) 40 MG tablet TAKE 1 TABLET DAILY 90 tablet 1    amLODIPine (NORVASC) 5 MG tablet TAKE 1 TABLET DAILY 90 tablet 1    venlafaxine (EFFEXOR XR) 150 MG extended release capsule TAKE 1 CAPSULE DAILY 90 capsule 1    ibuprofen (ADVIL;MOTRIN) 800 MG tablet TAKE 1 TABLET BY MOUTH THREE TIMES A DAY AS NEEDED FOR PAIN 90 tablet 0    atorvastatin (LIPITOR) 40 MG tablet TAKE 1 TABLET DAILY 90 tablet 1    gabapentin (NEURONTIN) 300 MG capsule Take 300 mg by mouth in the morning and at bedtime. Budeson-Glycopyrrol-Formoterol (BREZTRI AEROSPHERE) 160-9-4.8 MCG/ACT AERO Inhale 2 puffs into the lungs in the morning and at bedtime (Patient not taking: No sig reported) 2 each 0    Budeson-Glycopyrrol-Formoterol (BREZTRI AEROSPHERE) 160-9-4.8 MCG/ACT AERO Inhale 2 puffs into the lungs in the morning and at bedtime (Patient not taking: No sig reported) 1 each 5     No current facility-administered medications for this visit.       Hypertension ROS: taking medications as instructed, no medication side effects noted, no TIA's, no dyspnea on exertion, no swelling of ankles. Objective:   BP (!) 154/80   Pulse 87   Ht 5' (1.524 m)   Wt 148 lb 12.8 oz (67.5 kg)   SpO2 98%   BMI 29.06 kg/m²    Appearance alert, well appearing, and in no distress, oriented to person, place, and time, and overweight. General exam BP noted to be moderately elevated today in office, S1, S2 normal, no gallop, no murmur, chest clear, no JVD, no HSM, no edema. Lab review: labs are reviewed, up to date and normal.   MS: full ROM right shoulder. Trap and pec tenderness to palpation. DTR +2. Assessment:     Diagnosis Orders   1. Essential hypertension  Will add bystolic 2.5 mg to her norvasc 5mg and losartan 100mg daily. OV 3-4 weeks for recheck      2. Pain of right upper extremity  Discussed possible etiologies. Could re coming from her prior neck issues, or muscle tightness. Discussed PT, possible EMG. Patient declines at this time, will continue to monitor and call if worsening.

## 2023-01-23 RX ORDER — AMLODIPINE BESYLATE 5 MG/1
TABLET ORAL
Qty: 90 TABLET | Refills: 1 | Status: SHIPPED | OUTPATIENT
Start: 2023-01-23

## 2023-01-23 RX ORDER — PANTOPRAZOLE SODIUM 40 MG/1
TABLET, DELAYED RELEASE ORAL
Qty: 90 TABLET | Refills: 1 | Status: SHIPPED | OUTPATIENT
Start: 2023-01-23

## 2023-01-23 NOTE — TELEPHONE ENCOUNTER
Jl Found is requesting refill(s) amlodipine, protonix  Last OV 1/20/23 (pertaining to medication)  LR 8/29/22 (per medication requested)  Next office visit scheduled or attempted Yes   If no, reason:  2/17/23

## 2023-01-26 ENCOUNTER — TELEPHONE (OUTPATIENT)
Dept: FAMILY MEDICINE CLINIC | Age: 63
End: 2023-01-26

## 2023-01-26 NOTE — TELEPHONE ENCOUNTER
Pt states that even on the new BP medication, her BP is running high. She checked it today and it was 170/80.  She is currently taking Amlodipine 5 mg, losartan 500 mg, and Bystolic 2.5 mg

## 2023-01-27 ENCOUNTER — PATIENT MESSAGE (OUTPATIENT)
Dept: FAMILY MEDICINE CLINIC | Age: 63
End: 2023-01-27

## 2023-01-27 NOTE — TELEPHONE ENCOUNTER
From: Jeffrey Chu  To: Dore Merlin  Sent: 1/27/2023 3:21 PM EST  Subject: Bp    My bp was 166/94 this morning before meds and at 2:00pm it was 161/89. I was having headaches in my eyes yesterday and a little today, I dont know if that is anything concerning with high bp or not but my mind is focused on my bp being up now. One minute I feel good and the next I feel every little pain. At what point should my bp be before seeking care over the weekend if need be? Im sorry for interrupting your schedule but I worry too much. Thanks.  Jeffrey Chu

## 2023-02-13 NOTE — TELEPHONE ENCOUNTER
Fang Fox [phone] (home)    is requesting refill(s) of medication Nebivolol to preferred pharmacy [facility]    Last OV 1/20/23 (pertaining to medication)   Last refill 1/20/23(per medication requested)  Next office visit scheduled or attempted Yes  Date 2/17/23  If No, reason

## 2023-02-14 RX ORDER — NEBIVOLOL 2.5 MG/1
TABLET ORAL
Qty: 30 TABLET | Refills: 0 | Status: SHIPPED | OUTPATIENT
Start: 2023-02-14 | End: 2023-02-17

## 2023-02-17 ENCOUNTER — OFFICE VISIT (OUTPATIENT)
Dept: FAMILY MEDICINE CLINIC | Age: 63
End: 2023-02-17
Payer: COMMERCIAL

## 2023-02-17 VITALS
WEIGHT: 151.6 LBS | OXYGEN SATURATION: 96 % | HEIGHT: 60 IN | BODY MASS INDEX: 29.76 KG/M2 | HEART RATE: 88 BPM | DIASTOLIC BLOOD PRESSURE: 68 MMHG | SYSTOLIC BLOOD PRESSURE: 142 MMHG

## 2023-02-17 DIAGNOSIS — I10 ESSENTIAL HYPERTENSION: Primary | ICD-10-CM

## 2023-02-17 DIAGNOSIS — R06.83 SNORING: ICD-10-CM

## 2023-02-17 PROCEDURE — 99214 OFFICE O/P EST MOD 30 MIN: CPT | Performed by: PHYSICIAN ASSISTANT

## 2023-02-17 PROCEDURE — 4004F PT TOBACCO SCREEN RCVD TLK: CPT | Performed by: PHYSICIAN ASSISTANT

## 2023-02-17 PROCEDURE — 3017F COLORECTAL CA SCREEN DOC REV: CPT | Performed by: PHYSICIAN ASSISTANT

## 2023-02-17 PROCEDURE — G8484 FLU IMMUNIZE NO ADMIN: HCPCS | Performed by: PHYSICIAN ASSISTANT

## 2023-02-17 PROCEDURE — 3077F SYST BP >= 140 MM HG: CPT | Performed by: PHYSICIAN ASSISTANT

## 2023-02-17 PROCEDURE — G8417 CALC BMI ABV UP PARAM F/U: HCPCS | Performed by: PHYSICIAN ASSISTANT

## 2023-02-17 PROCEDURE — G8427 DOCREV CUR MEDS BY ELIG CLIN: HCPCS | Performed by: PHYSICIAN ASSISTANT

## 2023-02-17 PROCEDURE — 3078F DIAST BP <80 MM HG: CPT | Performed by: PHYSICIAN ASSISTANT

## 2023-02-17 RX ORDER — NEBIVOLOL 5 MG/1
5 TABLET ORAL DAILY
Qty: 90 TABLET | Refills: 0 | Status: SHIPPED | OUTPATIENT
Start: 2023-02-17

## 2023-02-17 NOTE — PROGRESS NOTES
Subjective:   Paras Mcrae is a 65 y.o. female with hypertension. No issues with bystolic but BP's still running elevated at home. When getting endoscopy she was told by nurse to discuss sleep apnea with her PCP as she was having issues with apnea. She does admit to snoring and daytime fatigue. Current Outpatient Medications   Medication Sig Dispense Refill    nebivolol (BYSTOLIC) 2.5 MG tablet TAKE 1 TABLET BY MOUTH EVERY DAY 30 tablet 0    pantoprazole (PROTONIX) 40 MG tablet TAKE 1 TABLET DAILY 90 tablet 1    amLODIPine (NORVASC) 5 MG tablet TAKE 1 TABLET DAILY 90 tablet 1    busPIRone (BUSPAR) 10 MG tablet TAKE 1 TABLET 3 TIMES A  tablet 1    losartan (COZAAR) 100 MG tablet TAKE 1 TABLET BY MOUTH EVERY DAY 30 tablet 5    venlafaxine (EFFEXOR XR) 75 MG extended release capsule TAKE 1 CAPSULE BY MOUTH EVERY DAY 90 capsule 1    ferrous sulfate (IRON 325) 325 (65 Fe) MG tablet Take 1 tablet by mouth daily (with breakfast) 180 tablet 1    Pancrelipase, Lip-Prot-Amyl, (ZENPEP) 96253-159602 units CPEP Take by mouth      fenofibrate (TRIGLIDE) 160 MG tablet TAKE 1 TABLET DAILY 90 tablet 1    venlafaxine (EFFEXOR XR) 150 MG extended release capsule TAKE 1 CAPSULE DAILY 90 capsule 1    atorvastatin (LIPITOR) 40 MG tablet TAKE 1 TABLET DAILY 90 tablet 1    gabapentin (NEURONTIN) 300 MG capsule Take 300 mg by mouth in the morning and at bedtime.       Budeson-Glycopyrrol-Formoterol (BREZTRI AEROSPHERE) 160-9-4.8 MCG/ACT AERO Inhale 2 puffs into the lungs in the morning and at bedtime (Patient not taking: No sig reported) 2 each 0    Budeson-Glycopyrrol-Formoterol (BREZTRI AEROSPHERE) 160-9-4.8 MCG/ACT AERO Inhale 2 puffs into the lungs in the morning and at bedtime (Patient not taking: No sig reported) 1 each 5    ibuprofen (ADVIL;MOTRIN) 800 MG tablet TAKE 1 TABLET BY MOUTH THREE TIMES A DAY AS NEEDED FOR PAIN (Patient not taking: Reported on 2/17/2023) 90 tablet 0     No current facility-administered medications for this visit. Hypertension ROS: taking medications as instructed, no medication side effects noted, no TIA's, no chest pain on exertion, no dyspnea on exertion, no swelling of ankles. Objective:   BP (!) 156/80 (Site: Right Upper Arm, Position: Sitting)   Pulse 88   Ht 5' (1.524 m)   Wt 151 lb 9.6 oz (68.8 kg)   SpO2 96%   BMI 29.61 kg/m²    Appearance alert, well appearing, and in no distress, oriented to person, place, and time, and overweight. General exam BP noted to be mildly elevated today in office, S1, S2 normal, no gallop, no murmur, chest clear, no JVD, no HSM, no edema. Assessment/Plan     Diagnosis Orders   1. Essential hypertension  Increase bystolic to 5mg daily- continue norvasc 5mg ha and losartan 100mg daily. 2. Snoring  Krissy Pressley MD, Pulmonary, [facility]      To call with BP reading over the next 2-3 weeks.

## 2023-02-21 ENCOUNTER — NURSE ONLY (OUTPATIENT)
Dept: FAMILY MEDICINE CLINIC | Age: 63
End: 2023-02-21
Payer: COMMERCIAL

## 2023-02-21 DIAGNOSIS — Z23 NEED FOR SHINGLES VACCINE: Primary | ICD-10-CM

## 2023-02-21 PROCEDURE — 90750 HZV VACC RECOMBINANT IM: CPT | Performed by: PHYSICIAN ASSISTANT

## 2023-02-21 PROCEDURE — 90471 IMMUNIZATION ADMIN: CPT | Performed by: PHYSICIAN ASSISTANT

## 2023-03-13 RX ORDER — NEBIVOLOL 2.5 MG/1
TABLET ORAL
Qty: 30 TABLET | Refills: 0 | OUTPATIENT
Start: 2023-03-13

## 2023-03-15 RX ORDER — NEBIVOLOL 2.5 MG/1
TABLET ORAL
Qty: 30 TABLET | Refills: 0 | OUTPATIENT
Start: 2023-03-15

## 2023-04-06 RX ORDER — NEBIVOLOL 5 MG/1
TABLET ORAL
Qty: 90 TABLET | Refills: 0 | Status: SHIPPED | OUTPATIENT
Start: 2023-04-06

## 2023-04-06 NOTE — TELEPHONE ENCOUNTER
Gerardo Peters [phone] (home)    is requesting refill(s) of medication Nebivolol to preferred pharmacy [facility]    Last OV 2/17/23 (pertaining to medication)   Last refill 2/17/23 (per medication requested)  Next office visit scheduled or attempted Yes  Date 8/18/23  If No, reason

## 2023-04-20 NOTE — TELEPHONE ENCOUNTER
Mini Alicia [phone] (home)    is requesting refill(s) of medication Venlafaxine to preferred pharmacy [facility]    Last OV 2/17/23 (pertaining to medication)   Last refill 8/29/22 (per medication requested)  Next office visit scheduled or attempted Yes  Date   If No, reason 8/18/23

## 2023-04-21 RX ORDER — VENLAFAXINE HYDROCHLORIDE 150 MG/1
CAPSULE, EXTENDED RELEASE ORAL
Qty: 90 CAPSULE | Refills: 1 | Status: SHIPPED | OUTPATIENT
Start: 2023-04-21

## 2023-05-13 DIAGNOSIS — E78.5 HYPERLIPIDEMIA, UNSPECIFIED HYPERLIPIDEMIA TYPE: ICD-10-CM

## 2023-05-13 DIAGNOSIS — K21.9 GASTROESOPHAGEAL REFLUX DISEASE, UNSPECIFIED WHETHER ESOPHAGITIS PRESENT: ICD-10-CM

## 2023-05-13 DIAGNOSIS — I10 ESSENTIAL HYPERTENSION: ICD-10-CM

## 2023-05-15 RX ORDER — AMLODIPINE BESYLATE 5 MG/1
TABLET ORAL
Qty: 90 TABLET | Refills: 1 | Status: SHIPPED | OUTPATIENT
Start: 2023-05-15

## 2023-05-15 RX ORDER — FENOFIBRATE 160 MG/1
TABLET ORAL
Qty: 90 TABLET | Refills: 1 | Status: SHIPPED | OUTPATIENT
Start: 2023-05-15

## 2023-05-15 RX ORDER — PANTOPRAZOLE SODIUM 40 MG/1
TABLET, DELAYED RELEASE ORAL
Qty: 90 TABLET | Refills: 1 | Status: SHIPPED | OUTPATIENT
Start: 2023-05-15

## 2023-05-15 NOTE — TELEPHONE ENCOUNTER
Zeynep Heaton is requesting refill(s) amlodipine, protonix  Last OV 2/17/23 (pertaining to medication)  LR 1/23/23 (per medication requested)  Next office visit scheduled or attempted Yes   If no, reason:  8/18/23

## 2023-05-25 NOTE — TELEPHONE ENCOUNTER
Jennifer Romano [phone] (home)    is requesting refill(s) of medication Ferrous Sulfate to preferred pharmacy [facility]    Last OV 2/17/23 (pertaining to medication)   Last refill 12/16/22 (per medication requested)  Next office visit scheduled or attempted Yes  Date 8/18/23  If No, reason

## 2023-05-26 RX ORDER — FERROUS SULFATE 325(65) MG
325 TABLET ORAL
Qty: 180 TABLET | Refills: 1 | Status: SHIPPED | OUTPATIENT
Start: 2023-05-26

## 2023-05-31 DIAGNOSIS — E78.5 HYPERLIPIDEMIA, UNSPECIFIED HYPERLIPIDEMIA TYPE: ICD-10-CM

## 2023-05-31 DIAGNOSIS — F41.9 ANXIETY: ICD-10-CM

## 2023-05-31 NOTE — TELEPHONE ENCOUNTER
Shona Shaw is requesting refill(s) buspar  Last OV 2/17/23 (pertaining to medication)  LR 1/5/23 (per medication requested)  Next office visit scheduled or attempted Yes   If no, reason:  8/18/23

## 2023-05-31 NOTE — TELEPHONE ENCOUNTER
Nivia [facility] [phone] (home)    is requesting refill(s) of medication Atorvastatin to preferred pharmacy [facility] [address] 2/17/23 (pertaining to medication)   Last refill 2/7/22 (per medication requested)  Next office visit scheduled or attempted Yes  Date 8/18/23  If No, reason

## 2023-06-01 RX ORDER — ATORVASTATIN CALCIUM 40 MG/1
TABLET, FILM COATED ORAL
Qty: 90 TABLET | Refills: 1 | Status: SHIPPED | OUTPATIENT
Start: 2023-06-01

## 2023-06-01 RX ORDER — BUSPIRONE HYDROCHLORIDE 10 MG/1
TABLET ORAL
Qty: 270 TABLET | Refills: 1 | Status: SHIPPED | OUTPATIENT
Start: 2023-06-01

## 2023-06-07 ENCOUNTER — HOSPITAL ENCOUNTER (EMERGENCY)
Age: 63
Discharge: HOME | End: 2023-06-07
Attending: EMERGENCY MEDICINE
Payer: COMMERCIAL

## 2023-06-07 ENCOUNTER — APPOINTMENT (OUTPATIENT)
Dept: GENERAL RADIOLOGY | Age: 63
End: 2023-06-07
Payer: COMMERCIAL

## 2023-06-07 VITALS
DIASTOLIC BLOOD PRESSURE: 80 MMHG | RESPIRATION RATE: 22 BRPM | SYSTOLIC BLOOD PRESSURE: 171 MMHG | OXYGEN SATURATION: 94 % | TEMPERATURE: 98.7 F | WEIGHT: 152 LBS | HEART RATE: 73 BPM | BODY MASS INDEX: 29.69 KG/M2

## 2023-06-07 DIAGNOSIS — R07.9 CHEST PAIN, UNSPECIFIED TYPE: Primary | ICD-10-CM

## 2023-06-07 LAB
ALBUMIN SERPL-MCNC: 4.1 G/DL (ref 3.4–5)
ALBUMIN/GLOB SERPL: 1.3 {RATIO} (ref 1.1–2.2)
ALP SERPL-CCNC: 73 U/L (ref 40–129)
ALT SERPL-CCNC: 25 U/L (ref 10–40)
ANION GAP SERPL CALCULATED.3IONS-SCNC: 11 MMOL/L (ref 3–16)
AST SERPL-CCNC: 29 U/L (ref 15–37)
BASOPHILS # BLD: 0.1 K/UL (ref 0–0.2)
BASOPHILS NFR BLD: 1.3 %
BILIRUB SERPL-MCNC: 0.4 MG/DL (ref 0–1)
BUN SERPL-MCNC: 9 MG/DL (ref 7–20)
CALCIUM SERPL-MCNC: 9.3 MG/DL (ref 8.3–10.6)
CHLORIDE SERPL-SCNC: 104 MMOL/L (ref 99–110)
CO2 SERPL-SCNC: 23 MMOL/L (ref 21–32)
CREAT SERPL-MCNC: 0.7 MG/DL (ref 0.6–1.2)
DEPRECATED RDW RBC AUTO: 13.6 % (ref 12.4–15.4)
EOSINOPHIL # BLD: 0 K/UL (ref 0–0.6)
EOSINOPHIL NFR BLD: 0.2 %
GFR SERPLBLD CREATININE-BSD FMLA CKD-EPI: >60 ML/MIN/{1.73_M2}
GLUCOSE SERPL-MCNC: 93 MG/DL (ref 70–99)
HCT VFR BLD AUTO: 40.9 % (ref 36–48)
HGB BLD-MCNC: 13.4 G/DL (ref 12–16)
LYMPHOCYTES # BLD: 1.3 K/UL (ref 1–5.1)
LYMPHOCYTES NFR BLD: 28.9 %
MCH RBC QN AUTO: 28.9 PG (ref 26–34)
MCHC RBC AUTO-ENTMCNC: 32.8 G/DL (ref 31–36)
MCV RBC AUTO: 88.3 FL (ref 80–100)
MONOCYTES # BLD: 0.5 K/UL (ref 0–1.3)
MONOCYTES NFR BLD: 10.8 %
NEUTROPHILS # BLD: 2.7 K/UL (ref 1.7–7.7)
NEUTROPHILS NFR BLD: 58.8 %
PLATELET # BLD AUTO: 225 K/UL (ref 135–450)
PMV BLD AUTO: 9.7 FL (ref 5–10.5)
POTASSIUM SERPL-SCNC: 3.6 MMOL/L (ref 3.5–5.1)
PROT SERPL-MCNC: 7.2 G/DL (ref 6.4–8.2)
RBC # BLD AUTO: 4.63 M/UL (ref 4–5.2)
SODIUM SERPL-SCNC: 138 MMOL/L (ref 136–145)
TROPONIN, HIGH SENSITIVITY: 7 NG/L (ref 0–14)
TROPONIN, HIGH SENSITIVITY: 7 NG/L (ref 0–14)
WBC # BLD AUTO: 4.6 K/UL (ref 4–11)

## 2023-06-07 PROCEDURE — 71045 X-RAY EXAM CHEST 1 VIEW: CPT

## 2023-06-07 PROCEDURE — 85025 COMPLETE CBC W/AUTO DIFF WBC: CPT

## 2023-06-07 PROCEDURE — 80053 COMPREHEN METABOLIC PANEL: CPT

## 2023-06-07 PROCEDURE — 93005 ELECTROCARDIOGRAM TRACING: CPT | Performed by: EMERGENCY MEDICINE

## 2023-06-07 PROCEDURE — 84484 ASSAY OF TROPONIN QUANT: CPT

## 2023-06-07 RX ORDER — MAG HYDROX/ALUMINUM HYD/SIMETH 400-400-40
30 SUSPENSION, ORAL (FINAL DOSE FORM) ORAL EVERY 6 HOURS PRN
Qty: 360 ML | Refills: 1 | Status: SHIPPED | OUTPATIENT
Start: 2023-06-07

## 2023-06-07 ASSESSMENT — PAIN - FUNCTIONAL ASSESSMENT: PAIN_FUNCTIONAL_ASSESSMENT: NONE - DENIES PAIN

## 2023-06-07 NOTE — ED PROVIDER NOTES
TABLET DAILY 5/15/23   MRAY Gonzalez   fenofibrate (TRIGLIDE) 160 MG tablet TAKE 1 TABLET DAILY 5/15/23   MARY Gonzalez   venlafaxine (EFFEXOR XR) 150 MG extended release capsule TAKE 1 CAPSULE DAILY 4/21/23   MARY Gonzalez   nebivolol (BYSTOLIC) 5 MG tablet TAKE 1 TABLET BY MOUTH EVERY DAY 4/6/23   MARY Morales   losartan (COZAAR) 100 MG tablet TAKE 1 TABLET BY MOUTH EVERY DAY 1/4/23   MARY Gonzalez   venlafaxine (EFFEXOR XR) 75 MG extended release capsule TAKE 1 CAPSULE BY MOUTH EVERY DAY 12/31/22   MARY Gonzalez   Budeson-Glycopyrrol-Formoterol (BREZTRI AEROSPHERE) 160-9-4.8 MCG/ACT AERO Inhale 2 puffs into the lungs in the morning and at bedtime  Patient not taking: No sig reported 12/15/22   MARY Russell   Budeson-Glycopyrrol-Formoterol (BREZTRI AEROSPHERE) 160-9-4.8 MCG/ACT AERO Inhale 2 puffs into the lungs in the morning and at bedtime  Patient not taking: No sig reported 12/15/22   MARY Russell   Pancrelipase, Lip-Prot-Amyl, (ZENPEP) 04913-406171 units CPEP Take by mouth    Historical Provider, MD   ibuprofen (ADVIL;MOTRIN) 800 MG tablet TAKE 1 TABLET BY MOUTH THREE TIMES A DAY AS NEEDED FOR PAIN  Patient not taking: Reported on 2/17/2023 5/17/22   MARY Gonzalez   gabapentin (NEURONTIN) 300 MG capsule Take 300 mg by mouth in the morning and at bedtime.   Patient not taking: Reported on 6/7/2023    Historical Provider, MD       Allergies as of 06/07/2023 - Fully Reviewed 06/07/2023   Allergen Reaction Noted    Erythromycin Nausea Only 12/30/2014       Past Medical History:   Diagnosis Date    Anxiety     Arthritis     Degenerative arthritis     back     Depression     Exocrine pancreatic insufficiency     Dr. Elida Sellers    Hyperlipidemia     Hypertension         Surgical History:   Past Surgical History:   Procedure Laterality Date    APPENDECTOMY      CHOLECYSTECTOMY      COLONOSCOPY  12/30/14    normal    ENDOMETRIAL ABLATION          Family History:    Family History

## 2023-06-08 ENCOUNTER — APPOINTMENT (OUTPATIENT)
Dept: CT IMAGING | Age: 63
End: 2023-06-08
Payer: COMMERCIAL

## 2023-06-08 ENCOUNTER — HOSPITAL ENCOUNTER (EMERGENCY)
Age: 63
Discharge: HOME | End: 2023-06-09
Attending: EMERGENCY MEDICINE
Payer: COMMERCIAL

## 2023-06-08 DIAGNOSIS — R51.9 NONINTRACTABLE HEADACHE, UNSPECIFIED CHRONICITY PATTERN, UNSPECIFIED HEADACHE TYPE: ICD-10-CM

## 2023-06-08 DIAGNOSIS — I10 HYPERTENSION, UNSPECIFIED TYPE: Primary | ICD-10-CM

## 2023-06-08 LAB
ALBUMIN SERPL-MCNC: 3.7 G/DL (ref 3.4–5)
ALBUMIN/GLOB SERPL: 1.3 {RATIO} (ref 1.1–2.2)
ALP SERPL-CCNC: 70 U/L (ref 40–129)
ALT SERPL-CCNC: 24 U/L (ref 10–40)
ANION GAP SERPL CALCULATED.3IONS-SCNC: 13 MMOL/L (ref 3–16)
AST SERPL-CCNC: 23 U/L (ref 15–37)
BILIRUB SERPL-MCNC: <0.2 MG/DL (ref 0–1)
BUN SERPL-MCNC: 9 MG/DL (ref 7–20)
CALCIUM SERPL-MCNC: 8.8 MG/DL (ref 8.3–10.6)
CHLORIDE SERPL-SCNC: 106 MMOL/L (ref 99–110)
CO2 SERPL-SCNC: 20 MMOL/L (ref 21–32)
CREAT SERPL-MCNC: 0.7 MG/DL (ref 0.6–1.2)
DEPRECATED RDW RBC AUTO: 13.6 % (ref 12.4–15.4)
EKG ATRIAL RATE: 73 BPM
EKG DIAGNOSIS: NORMAL
EKG P AXIS: 57 DEGREES
EKG P-R INTERVAL: 162 MS
EKG Q-T INTERVAL: 392 MS
EKG QRS DURATION: 88 MS
EKG QTC CALCULATION (BAZETT): 431 MS
EKG R AXIS: 47 DEGREES
EKG T AXIS: 52 DEGREES
EKG VENTRICULAR RATE: 73 BPM
GFR SERPLBLD CREATININE-BSD FMLA CKD-EPI: >60 ML/MIN/{1.73_M2}
GLUCOSE SERPL-MCNC: 104 MG/DL (ref 70–99)
HCT VFR BLD AUTO: 40.3 % (ref 36–48)
HGB BLD-MCNC: 13.2 G/DL (ref 12–16)
MAGNESIUM SERPL-MCNC: 1.9 MG/DL (ref 1.8–2.4)
MCH RBC QN AUTO: 29.2 PG (ref 26–34)
MCHC RBC AUTO-ENTMCNC: 32.8 G/DL (ref 31–36)
MCV RBC AUTO: 89 FL (ref 80–100)
PLATELET # BLD AUTO: 188 K/UL (ref 135–450)
PMV BLD AUTO: 9.7 FL (ref 5–10.5)
POTASSIUM SERPL-SCNC: 3.3 MMOL/L (ref 3.5–5.1)
PROT SERPL-MCNC: 6.6 G/DL (ref 6.4–8.2)
RBC # BLD AUTO: 4.53 M/UL (ref 4–5.2)
SODIUM SERPL-SCNC: 139 MMOL/L (ref 136–145)
TROPONIN, HIGH SENSITIVITY: 7 NG/L (ref 0–14)
WBC # BLD AUTO: 4.5 K/UL (ref 4–11)

## 2023-06-08 PROCEDURE — 2580000003 HC RX 258: Performed by: EMERGENCY MEDICINE

## 2023-06-08 PROCEDURE — 80053 COMPREHEN METABOLIC PANEL: CPT

## 2023-06-08 PROCEDURE — 84484 ASSAY OF TROPONIN QUANT: CPT

## 2023-06-08 PROCEDURE — 93010 ELECTROCARDIOGRAM REPORT: CPT | Performed by: INTERNAL MEDICINE

## 2023-06-08 PROCEDURE — 93005 ELECTROCARDIOGRAM TRACING: CPT | Performed by: EMERGENCY MEDICINE

## 2023-06-08 PROCEDURE — 6360000002 HC RX W HCPCS: Performed by: EMERGENCY MEDICINE

## 2023-06-08 PROCEDURE — 70450 CT HEAD/BRAIN W/O DYE: CPT

## 2023-06-08 PROCEDURE — 85027 COMPLETE CBC AUTOMATED: CPT

## 2023-06-08 PROCEDURE — 83735 ASSAY OF MAGNESIUM: CPT

## 2023-06-08 RX ORDER — KETOROLAC TROMETHAMINE 30 MG/ML
15 INJECTION, SOLUTION INTRAMUSCULAR; INTRAVENOUS ONCE
Status: COMPLETED | OUTPATIENT
Start: 2023-06-08 | End: 2023-06-08

## 2023-06-08 RX ORDER — 0.9 % SODIUM CHLORIDE 0.9 %
1000 INTRAVENOUS SOLUTION INTRAVENOUS ONCE
Status: COMPLETED | OUTPATIENT
Start: 2023-06-08 | End: 2023-06-09

## 2023-06-08 RX ORDER — DIPHENHYDRAMINE HYDROCHLORIDE 50 MG/ML
25 INJECTION INTRAMUSCULAR; INTRAVENOUS ONCE
Status: COMPLETED | OUTPATIENT
Start: 2023-06-08 | End: 2023-06-08

## 2023-06-08 RX ORDER — METOCLOPRAMIDE HYDROCHLORIDE 5 MG/ML
10 INJECTION INTRAMUSCULAR; INTRAVENOUS ONCE
Status: COMPLETED | OUTPATIENT
Start: 2023-06-08 | End: 2023-06-08

## 2023-06-08 RX ADMIN — METOCLOPRAMIDE HYDROCHLORIDE 10 MG: 5 INJECTION INTRAMUSCULAR; INTRAVENOUS at 23:33

## 2023-06-08 RX ADMIN — SODIUM CHLORIDE 1000 ML: 9 INJECTION, SOLUTION INTRAVENOUS at 23:30

## 2023-06-08 RX ADMIN — KETOROLAC TROMETHAMINE 15 MG: 30 INJECTION, SOLUTION INTRAMUSCULAR; INTRAVENOUS at 23:34

## 2023-06-08 RX ADMIN — DIPHENHYDRAMINE HYDROCHLORIDE 25 MG: 50 INJECTION, SOLUTION INTRAMUSCULAR; INTRAVENOUS at 23:34

## 2023-06-08 ASSESSMENT — PAIN - FUNCTIONAL ASSESSMENT: PAIN_FUNCTIONAL_ASSESSMENT: 0-10

## 2023-06-08 ASSESSMENT — PAIN SCALES - GENERAL: PAINLEVEL_OUTOF10: 3

## 2023-06-08 ASSESSMENT — PAIN DESCRIPTION - LOCATION: LOCATION: HEAD

## 2023-06-09 ENCOUNTER — OFFICE VISIT (OUTPATIENT)
Dept: FAMILY MEDICINE CLINIC | Age: 63
End: 2023-06-09
Payer: COMMERCIAL

## 2023-06-09 VITALS
HEIGHT: 60 IN | WEIGHT: 156.2 LBS | DIASTOLIC BLOOD PRESSURE: 74 MMHG | SYSTOLIC BLOOD PRESSURE: 194 MMHG | BODY MASS INDEX: 30.67 KG/M2 | OXYGEN SATURATION: 92 % | HEART RATE: 94 BPM

## 2023-06-09 VITALS
TEMPERATURE: 98.2 F | DIASTOLIC BLOOD PRESSURE: 62 MMHG | RESPIRATION RATE: 20 BRPM | HEART RATE: 85 BPM | OXYGEN SATURATION: 94 % | SYSTOLIC BLOOD PRESSURE: 148 MMHG

## 2023-06-09 DIAGNOSIS — I10 HYPERTENSION, UNSPECIFIED TYPE: Primary | ICD-10-CM

## 2023-06-09 DIAGNOSIS — R06.09 DOE (DYSPNEA ON EXERTION): ICD-10-CM

## 2023-06-09 LAB
EKG ATRIAL RATE: 73 BPM
EKG DIAGNOSIS: NORMAL
EKG P AXIS: 64 DEGREES
EKG P-R INTERVAL: 152 MS
EKG Q-T INTERVAL: 408 MS
EKG QRS DURATION: 92 MS
EKG QTC CALCULATION (BAZETT): 449 MS
EKG R AXIS: 46 DEGREES
EKG T AXIS: 54 DEGREES
EKG VENTRICULAR RATE: 73 BPM

## 2023-06-09 PROCEDURE — 3077F SYST BP >= 140 MM HG: CPT | Performed by: PHYSICIAN ASSISTANT

## 2023-06-09 PROCEDURE — 93010 ELECTROCARDIOGRAM REPORT: CPT | Performed by: INTERNAL MEDICINE

## 2023-06-09 PROCEDURE — G8417 CALC BMI ABV UP PARAM F/U: HCPCS | Performed by: PHYSICIAN ASSISTANT

## 2023-06-09 PROCEDURE — 4004F PT TOBACCO SCREEN RCVD TLK: CPT | Performed by: PHYSICIAN ASSISTANT

## 2023-06-09 PROCEDURE — 3078F DIAST BP <80 MM HG: CPT | Performed by: PHYSICIAN ASSISTANT

## 2023-06-09 PROCEDURE — 3017F COLORECTAL CA SCREEN DOC REV: CPT | Performed by: PHYSICIAN ASSISTANT

## 2023-06-09 PROCEDURE — G8427 DOCREV CUR MEDS BY ELIG CLIN: HCPCS | Performed by: PHYSICIAN ASSISTANT

## 2023-06-09 PROCEDURE — 99214 OFFICE O/P EST MOD 30 MIN: CPT | Performed by: PHYSICIAN ASSISTANT

## 2023-06-09 RX ORDER — FLUTICASONE FUROATE, UMECLIDINIUM BROMIDE AND VILANTEROL TRIFENATATE 100; 62.5; 25 UG/1; UG/1; UG/1
1 POWDER RESPIRATORY (INHALATION) DAILY
Qty: 1 EACH | Refills: 0 | COMMUNITY
Start: 2023-06-09

## 2023-06-09 RX ORDER — CLONIDINE HYDROCHLORIDE 0.1 MG/1
0.1 TABLET ORAL DAILY
Qty: 7 TABLET | Refills: 0 | Status: SHIPPED | OUTPATIENT
Start: 2023-06-09

## 2023-06-09 RX ORDER — HYDROCHLOROTHIAZIDE 12.5 MG/1
12.5 CAPSULE, GELATIN COATED ORAL EVERY MORNING
Qty: 30 CAPSULE | Refills: 0 | Status: SHIPPED | OUTPATIENT
Start: 2023-06-09

## 2023-06-09 SDOH — ECONOMIC STABILITY: FOOD INSECURITY: WITHIN THE PAST 12 MONTHS, THE FOOD YOU BOUGHT JUST DIDN'T LAST AND YOU DIDN'T HAVE MONEY TO GET MORE.: NEVER TRUE

## 2023-06-09 SDOH — ECONOMIC STABILITY: INCOME INSECURITY: HOW HARD IS IT FOR YOU TO PAY FOR THE VERY BASICS LIKE FOOD, HOUSING, MEDICAL CARE, AND HEATING?: NOT HARD AT ALL

## 2023-06-09 SDOH — ECONOMIC STABILITY: HOUSING INSECURITY
IN THE LAST 12 MONTHS, WAS THERE A TIME WHEN YOU DID NOT HAVE A STEADY PLACE TO SLEEP OR SLEPT IN A SHELTER (INCLUDING NOW)?: NO

## 2023-06-09 SDOH — ECONOMIC STABILITY: FOOD INSECURITY: WITHIN THE PAST 12 MONTHS, YOU WORRIED THAT YOUR FOOD WOULD RUN OUT BEFORE YOU GOT MONEY TO BUY MORE.: NEVER TRUE

## 2023-06-09 ASSESSMENT — ENCOUNTER SYMPTOMS
SHORTNESS OF BREATH: 1
WHEEZING: 1
CHEST TIGHTNESS: 1
GASTROINTESTINAL NEGATIVE: 1

## 2023-06-09 ASSESSMENT — PAIN SCALES - GENERAL: PAINLEVEL_OUTOF10: 1

## 2023-06-09 NOTE — PROGRESS NOTES
Subjective:      Patient ID: Kaitlin Dial is a 65 y.o. female. HPI    Started Wednesday with headache and chest pressure, BP was running quite high so  called the squad. Had labs done in there ER, no medication changes. Was feeling bad again yesterday so went to [facility] to have BP checked, over 200 and they took her to the ER. Again no med changes, given a cocktail for her headache which helped her BP some but not her headache. Today still feels terrible, headache, very SOB even at rest. She admits to a lot of stress and anxiety. No recent flights. She did drive to [place] in April. Review of Systems   Constitutional:  Positive for fatigue. Negative for chills and diaphoresis. HENT:  Positive for congestion. Respiratory:  Positive for chest tightness, shortness of breath and wheezing. Cardiovascular:  Positive for chest pain. Negative for palpitations and leg swelling. Gastrointestinal: Negative. Neurological:  Positive for headaches. Negative for weakness and light-headedness. Objective:   Physical Exam  Constitutional:       Appearance: Normal appearance. Neck:      Vascular: No carotid bruit. Cardiovascular:      Rate and Rhythm: Normal rate and regular rhythm. Pulmonary:      Breath sounds: Wheezing and rhonchi present. Comments: SOB noted but no distress  Skin:     General: Skin is warm and dry. Neurological:      General: No focal deficit present. Mental Status: She is alert and oriented to person, place, and time. Assessment / Plan:          Diagnosis Orders   1. Hypertension, unspecified type  Barbara Moses MD - Cardiology, [facility]      2. ORTIZ (dyspnea on exertion)  Barbara Moses MD - Cardiology, [facility]        Will have her see cardiology due to difficult to control BP and her SOB. She does have anxiety and COPD, will start trelegy. She is a smoker.  Will add clonidine daily for the next week and put her back

## 2023-06-09 NOTE — ED PROVIDER NOTES
patient's presentation includes, but is not limited to: Essential hypertension, electrolyte abnormality, tension headache, intracranial bleed, hypertensive urgency/hypertensive emergency    CONSULTS: (Who and What was discussed)  None    Discussion with Other Professionals : None        Social Determinants : None    Patient's care impacted by chronic condition(s):     Past Medical History:   Diagnosis Date    Anxiety     Arthritis     Degenerative arthritis     back     Depression     Exocrine pancreatic insufficiency     Dr. Sydney Medellin    Hyperlipidemia     Hypertension          Records Reviewed : None    Clinical information obtained from an independent historian. Disposition Considerations (include 1 Tests not done, Shared Decision Making, Pt Expectation of Test or Tx.):     Patient was ordered migraine cocktail for her headache. Patient's blood pressure continues to improve to the 140s. Patient advised to keep primary care doctor appointment for discussion of antihypertensive regimen. The patient will be discharged from the emergency department. The patient was counseled on their diagnosis and any medications prescribed. They were advised on the need for PCP followup. They were counseled on the need to return to the emergency department if any of their symptoms were to worsen, change or have any other concerns. Discharged in stable condition. I am the Primary Clinician of Record. FINAL IMPRESSION      1. Hypertension, unspecified type    2. Nonintractable headache, unspecified chronicity pattern, unspecified headache type          DISPOSITION/PLAN     DISPOSITION        PATIENT REFERRED TO:  No follow-up provider specified. DISCHARGE MEDICATIONS:  Patient was given scripts for the following medications.  I counseled patient how to take these medications:  New Prescriptions    No medications on file       DISCONTINUED MEDICATIONS:  Discontinued Medications    No medications on file

## 2023-06-09 NOTE — DISCHARGE INSTRUCTIONS
The CAT scan did not show any evidence of stroke or bleeding. Please follow-up with your primary care doctor tomorrow to discuss high blood pressure medications. Return for worsening symptoms.

## 2023-06-22 ENCOUNTER — TELEPHONE (OUTPATIENT)
Dept: FAMILY MEDICINE CLINIC | Age: 63
End: 2023-06-22

## 2023-06-22 NOTE — TELEPHONE ENCOUNTER
[facility] called to confirm patient's dose of effexor and get permission to fill both strengths of the 75mg and 150mg. Patient was contacted to verify that she is in fact taking both strengths to equal 225mg daily which is her correct dose. Pharmacy was given permission to fill.

## 2023-07-03 RX ORDER — HYDROCHLOROTHIAZIDE 12.5 MG/1
CAPSULE, GELATIN COATED ORAL
Qty: 30 CAPSULE | Refills: 5 | Status: SHIPPED | OUTPATIENT
Start: 2023-07-03

## 2023-07-03 NOTE — TELEPHONE ENCOUNTER
Nadine Finney is requesting refill(s) hctz  Last OV 6/16/23 (pertaining to medication)  LR 6/9/23 (per medication requested)  Next office visit scheduled or attempted Yes   If no, reason:  8/18/23

## 2023-07-10 RX ORDER — CLONIDINE HYDROCHLORIDE 0.1 MG/1
TABLET ORAL
Qty: 30 TABLET | Refills: 2 | Status: SHIPPED | OUTPATIENT
Start: 2023-07-10

## 2023-07-10 RX ORDER — VENLAFAXINE HYDROCHLORIDE 75 MG/1
CAPSULE, EXTENDED RELEASE ORAL
Qty: 90 CAPSULE | Refills: 1 | OUTPATIENT
Start: 2023-07-10

## 2023-07-10 NOTE — TELEPHONE ENCOUNTER
Nika Byers [phone] (home)    is requesting refill(s) of medication Clonidine to preferred pharmacy [facility]    Last OV 6/16/23 (pertaining to medication)   Last refill 6/16/23 (per medication requested)  Next office visit scheduled or attempted Yes  Date 8/18/23  If No, reason

## 2023-07-13 NOTE — TELEPHONE ENCOUNTER
[facility] [phone] (home)    is requesting refill(s) of medication Losartan Potassium 100 mg tablet to preferred pharmacy [facility]    Last OV 06/16/23 (pertaining to medication)   Last refill 01/04/23  (per medication requested)  Next office visit scheduled or attempted Yes  Date 08/18/23

## 2023-07-14 RX ORDER — LOSARTAN POTASSIUM 100 MG/1
TABLET ORAL
Qty: 90 TABLET | Refills: 1 | Status: SHIPPED | OUTPATIENT
Start: 2023-07-14

## 2023-07-21 ENCOUNTER — OFFICE VISIT (OUTPATIENT)
Dept: FAMILY MEDICINE CLINIC | Age: 63
End: 2023-07-21
Payer: COMMERCIAL

## 2023-07-21 VITALS
DIASTOLIC BLOOD PRESSURE: 66 MMHG | BODY MASS INDEX: 29.45 KG/M2 | HEIGHT: 60 IN | SYSTOLIC BLOOD PRESSURE: 110 MMHG | WEIGHT: 150 LBS | OXYGEN SATURATION: 97 % | HEART RATE: 72 BPM

## 2023-07-21 DIAGNOSIS — F32.89 OTHER DEPRESSION: ICD-10-CM

## 2023-07-21 DIAGNOSIS — F41.9 ANXIETY: Primary | ICD-10-CM

## 2023-07-21 PROCEDURE — 99213 OFFICE O/P EST LOW 20 MIN: CPT | Performed by: PHYSICIAN ASSISTANT

## 2023-07-21 PROCEDURE — G8417 CALC BMI ABV UP PARAM F/U: HCPCS | Performed by: PHYSICIAN ASSISTANT

## 2023-07-21 PROCEDURE — 3017F COLORECTAL CA SCREEN DOC REV: CPT | Performed by: PHYSICIAN ASSISTANT

## 2023-07-21 PROCEDURE — 3074F SYST BP LT 130 MM HG: CPT | Performed by: PHYSICIAN ASSISTANT

## 2023-07-21 PROCEDURE — 4004F PT TOBACCO SCREEN RCVD TLK: CPT | Performed by: PHYSICIAN ASSISTANT

## 2023-07-21 PROCEDURE — 3078F DIAST BP <80 MM HG: CPT | Performed by: PHYSICIAN ASSISTANT

## 2023-07-21 PROCEDURE — G8427 DOCREV CUR MEDS BY ELIG CLIN: HCPCS | Performed by: PHYSICIAN ASSISTANT

## 2023-07-21 ASSESSMENT — ENCOUNTER SYMPTOMS: RESPIRATORY NEGATIVE: 1

## 2023-07-21 NOTE — PROGRESS NOTES
Subjective:      Patient ID: Riya Maxwell is a 65 y.o. female. HPI    Patient presents today for follow up of her anxiety and depression. She reports continued increase in her anxiety. She worries all the time, continually picks at her skin, always feels nervous and unsettled. She is taking effexor and buspar and both are maxed dosing at this point. Strong family history of mental illness, mother and grandmother were exactly the same as she is. Review of Systems   Constitutional: Negative. Respiratory: Negative. Cardiovascular: Negative. Psychiatric/Behavioral:  The patient is nervous/anxious. Objective:   Physical Exam  Constitutional:       Appearance: Normal appearance. Pulmonary:      Effort: Pulmonary effort is normal.   Neurological:      General: No focal deficit present. Mental Status: She is alert and oriented to person, place, and time. Assessment / Plan:          Diagnosis Orders   1. Anxiety  REGGIE - GORDON Rivera, Psychiatry, [place]      2. Other depression  REGGIE - GORDON Rivera, Psychiatry, [place]      Discussed with patient that she is on max dosing of both the effexor and buspar, weaning off the effexor will be difficult.  Recommend she see psych at this point as symptoms are not under good control

## 2023-07-24 RX ORDER — CLONIDINE HYDROCHLORIDE 0.1 MG/1
TABLET ORAL
Qty: 90 TABLET | Refills: 0 | OUTPATIENT
Start: 2023-07-24

## 2023-07-24 NOTE — TELEPHONE ENCOUNTER
Hobert Gitelman [phone] (home)    is requesting refill(s) of medication Clonidine 0.1 mg tablet to preferred pharmacy [facility]    Last OV 07/21/23 (pertaining to medication)   Last refill 07/10/23 (per medication requested)  Next office visit scheduled or attempted Yes  Date 08/18/23

## 2023-08-09 ENCOUNTER — OFFICE VISIT (OUTPATIENT)
Dept: CARDIOLOGY CLINIC | Age: 63
End: 2023-08-09
Payer: COMMERCIAL

## 2023-08-09 VITALS
BODY MASS INDEX: 30.15 KG/M2 | OXYGEN SATURATION: 96 % | SYSTOLIC BLOOD PRESSURE: 130 MMHG | HEIGHT: 60 IN | HEART RATE: 58 BPM | WEIGHT: 153.6 LBS | DIASTOLIC BLOOD PRESSURE: 70 MMHG

## 2023-08-09 DIAGNOSIS — I10 ESSENTIAL HYPERTENSION: Primary | ICD-10-CM

## 2023-08-09 DIAGNOSIS — F17.200 TOBACCO USE DISORDER: ICD-10-CM

## 2023-08-09 DIAGNOSIS — R29.818 SUSPECTED SLEEP APNEA: ICD-10-CM

## 2023-08-09 DIAGNOSIS — I73.9 CLAUDICATION (HCC): ICD-10-CM

## 2023-08-09 DIAGNOSIS — E78.5 HYPERLIPIDEMIA, UNSPECIFIED HYPERLIPIDEMIA TYPE: ICD-10-CM

## 2023-08-09 PROCEDURE — 93000 ELECTROCARDIOGRAM COMPLETE: CPT | Performed by: INTERNAL MEDICINE

## 2023-08-09 PROCEDURE — 99205 OFFICE O/P NEW HI 60 MIN: CPT | Performed by: INTERNAL MEDICINE

## 2023-08-09 PROCEDURE — 3075F SYST BP GE 130 - 139MM HG: CPT | Performed by: INTERNAL MEDICINE

## 2023-08-09 PROCEDURE — 3078F DIAST BP <80 MM HG: CPT | Performed by: INTERNAL MEDICINE

## 2023-08-09 PROCEDURE — G8417 CALC BMI ABV UP PARAM F/U: HCPCS | Performed by: INTERNAL MEDICINE

## 2023-08-09 PROCEDURE — G8427 DOCREV CUR MEDS BY ELIG CLIN: HCPCS | Performed by: INTERNAL MEDICINE

## 2023-08-09 RX ORDER — CARVEDILOL 12.5 MG/1
12.5 TABLET ORAL 2 TIMES DAILY
Qty: 180 TABLET | Refills: 3 | Status: SHIPPED | OUTPATIENT
Start: 2023-08-09

## 2023-08-09 NOTE — PROGRESS NOTES
[address]  1960    2023    Reason for Consult:    CC: \" I have high blood pressure \"    HPI:  The patient is 65 y.o. female with a past medical history significant for hypertension and hyperlipidemia. She was sen in office on 2023 by her PCP MARY Hanson  and reported symptoms of headache and chest pressure and elevated BP for which they sought treatment in the ED. Today, she presents to office to establish care for her hypertension. She recalls her visti to the ED. She states she has garcia's esophagus and this was the reason for her elevated blood pressure. She states her losartan was changed from Losartan/HCTZ to just Losartan. She is currently on 5 antihypertensive agents She checks BP at home and reports 's. She has not been following a low sodium diet. She smokes a pack a day for 40 years. She endorses pain in legs with walking. She does not exercise. Father  in his 52's from heart disease. She is compliant with her medications and tolerating them well. She  edema, shortness of breath, heart racing, palpitations, lightheadedness, dizziness, syncope, presyncope,  PND or orthopnea. Review of Systems:  Constitutional: No fatigue, weakness, night sweats or fever. HEENT: No new vision difficulties or ringing in the ears. Respiratory: No new SOB, PND, orthopnea or cough. Cardiovascular: See HPI   GI: No n/v, diarrhea, constipation, abdominal pain or changes in bowel habits. No melena, no hematochezia  : No urinary frequency, urgency, incontinence, hematuria or dysuria. Skin: No cyanosis or skin lesions. Musculoskeletal: No new muscle or joint pain. Neurological: No syncope or TIA-like symptoms.   Psychiatric: No anxiety, insomnia or depression     Past Medical History:   Diagnosis Date    Anxiety     Arthritis     Degenerative arthritis     back     Depression     Exocrine pancreatic insufficiency

## 2023-08-10 ENCOUNTER — HOSPITAL ENCOUNTER (OUTPATIENT)
Age: 63
Discharge: HOME | End: 2023-08-10
Payer: COMMERCIAL

## 2023-08-10 DIAGNOSIS — I10 ESSENTIAL HYPERTENSION: ICD-10-CM

## 2023-08-10 LAB — TSH SERPL DL<=0.005 MIU/L-ACNC: 1.73 UIU/ML (ref 0.27–4.2)

## 2023-08-10 PROCEDURE — 36415 COLL VENOUS BLD VENIPUNCTURE: CPT

## 2023-08-10 PROCEDURE — 84244 ASSAY OF RENIN: CPT

## 2023-08-10 PROCEDURE — 83835 ASSAY OF METANEPHRINES: CPT

## 2023-08-10 PROCEDURE — 82088 ASSAY OF ALDOSTERONE: CPT

## 2023-08-10 PROCEDURE — 84443 ASSAY THYROID STIM HORMONE: CPT

## 2023-08-14 LAB
ALDOST SERPL-MCNC: 8.4 NG/DL
ALDOST/RENIN PLAS-RTO: 28 RATIO
ANNOTATION COMMENT IMP: NORMAL
METANEPHS SERPL-SCNC: <0.1 NMOL/L (ref 0–0.49)
NORMETANEPHRINE SERPL-SCNC: 0.51 NMOL/L (ref 0–0.89)
RENIN PLAS-CCNC: 0.3 NG/ML/HR

## 2023-08-16 ENCOUNTER — TELEPHONE (OUTPATIENT)
Dept: FAMILY MEDICINE CLINIC | Age: 63
End: 2023-08-16

## 2023-08-16 NOTE — TELEPHONE ENCOUNTER
----- Message from Clemencia Edie sent at 8/16/2023  3:05 PM EDT -----  Subject: Message to Provider    QUESTIONS  Information for Provider? Pt would like to know if PCP would like her to   complete any bloodwork. Pt has recently completed some labs with   Cardiologist but wanted to confirm if PCP wanted to draw some more. Please   contact to confirm as soon as possible.   ---------------------------------------------------------------------------  --------------  iKke Aguero INFO  [phone]; OK to leave message on voicemail  ---------------------------------------------------------------------------  --------------  SCRIPT ANSWERS  Relationship to Patient?  Self

## 2023-08-31 ENCOUNTER — HOSPITAL ENCOUNTER (OUTPATIENT)
Dept: VASCULAR LAB | Age: 63
Discharge: HOME | End: 2023-08-31
Payer: COMMERCIAL

## 2023-08-31 DIAGNOSIS — I10 ESSENTIAL HYPERTENSION: ICD-10-CM

## 2023-08-31 PROCEDURE — 93975 VASCULAR STUDY: CPT

## 2023-09-05 ENCOUNTER — HOSPITAL ENCOUNTER (OUTPATIENT)
Dept: VASCULAR LAB | Age: 63
Discharge: HOME | End: 2023-09-05
Payer: COMMERCIAL

## 2023-09-05 DIAGNOSIS — I10 ESSENTIAL HYPERTENSION: ICD-10-CM

## 2023-09-05 DIAGNOSIS — I73.9 CLAUDICATION (HCC): ICD-10-CM

## 2023-09-05 PROCEDURE — 93925 LOWER EXTREMITY STUDY: CPT

## 2023-09-06 NOTE — TELEPHONE ENCOUNTER
Teressa Garcia [phone] (home)    is requesting refill(s) of medication Clonidine to preferred pharmacy [facility]    Last OV 7/21/23 (pertaining to medication)   Last refill 7/10/23 (per medication requested)  Next office visit scheduled or attempted No  Date   If No, reason

## 2023-09-07 RX ORDER — CLONIDINE HYDROCHLORIDE 0.1 MG/1
TABLET ORAL
Qty: 90 TABLET | Refills: 0 | Status: SHIPPED | OUTPATIENT
Start: 2023-09-07

## 2023-09-22 ENCOUNTER — TELEPHONE (OUTPATIENT)
Dept: FAMILY MEDICINE CLINIC | Age: 63
End: 2023-09-22

## 2023-09-22 RX ORDER — CIPROFLOXACIN 500 MG/1
500 TABLET, FILM COATED ORAL 2 TIMES DAILY
Qty: 10 TABLET | Refills: 0 | Status: SHIPPED | OUTPATIENT
Start: 2023-09-22 | End: 2023-09-27

## 2023-09-22 NOTE — TELEPHONE ENCOUNTER
Pt states that she has been having lower abdominal pressure when urinating, burning with urination starting last night around 7:30. She states that she is needing an antibiotic sent to [facility] in [facility].

## 2023-10-18 ENCOUNTER — OFFICE VISIT (OUTPATIENT)
Dept: CARDIOLOGY CLINIC | Age: 63
End: 2023-10-18
Payer: COMMERCIAL

## 2023-10-18 VITALS
SYSTOLIC BLOOD PRESSURE: 130 MMHG | BODY MASS INDEX: 30.43 KG/M2 | HEIGHT: 60 IN | HEART RATE: 68 BPM | DIASTOLIC BLOOD PRESSURE: 72 MMHG | WEIGHT: 155 LBS | OXYGEN SATURATION: 95 %

## 2023-10-18 DIAGNOSIS — I73.9 PAD (PERIPHERAL ARTERY DISEASE) (HCC): ICD-10-CM

## 2023-10-18 DIAGNOSIS — I10 ESSENTIAL HYPERTENSION: Primary | ICD-10-CM

## 2023-10-18 DIAGNOSIS — R29.818 SUSPECTED SLEEP APNEA: ICD-10-CM

## 2023-10-18 DIAGNOSIS — F17.200 TOBACCO USE DISORDER: ICD-10-CM

## 2023-10-18 PROCEDURE — G8484 FLU IMMUNIZE NO ADMIN: HCPCS | Performed by: INTERNAL MEDICINE

## 2023-10-18 PROCEDURE — G8427 DOCREV CUR MEDS BY ELIG CLIN: HCPCS | Performed by: INTERNAL MEDICINE

## 2023-10-18 PROCEDURE — 3078F DIAST BP <80 MM HG: CPT | Performed by: INTERNAL MEDICINE

## 2023-10-18 PROCEDURE — 4004F PT TOBACCO SCREEN RCVD TLK: CPT | Performed by: INTERNAL MEDICINE

## 2023-10-18 PROCEDURE — 3074F SYST BP LT 130 MM HG: CPT | Performed by: INTERNAL MEDICINE

## 2023-10-18 PROCEDURE — G8417 CALC BMI ABV UP PARAM F/U: HCPCS | Performed by: INTERNAL MEDICINE

## 2023-10-18 PROCEDURE — 99214 OFFICE O/P EST MOD 30 MIN: CPT | Performed by: INTERNAL MEDICINE

## 2023-10-18 PROCEDURE — 3017F COLORECTAL CA SCREEN DOC REV: CPT | Performed by: INTERNAL MEDICINE

## 2023-10-18 NOTE — PROGRESS NOTES
[address] [facility]  1960    2023    Reason for Consult:    CC: \" Feeling pretty good\"    HPI:  The patient is 59 y.o. female with a past medical history significant for hypertension and hyperlipidemia. She was sen in office on 2023 by her PCP MARY Mckeon  and reported symptoms of headache and chest pressure and elevated BP for which they sought treatment in the ED. Today, she presents to office to establish care for her hypertension. She recalls her visti to the ED. She states she has garcia's esophagus and this was the reason for her elevated blood pressure. She states her losartan was changed from Losartan/HCTZ to just Losartan. She is currently on 5 antihypertensive agents She checks BP at home and reports 's. She has not been following a low sodium diet. She smokes a pack a day for 40 years. She endorses pain in legs with walking. She does not exercise. Father  in his 52's from heart disease. She is compliant with her medications and tolerating them well. She  edema, shortness of breath, heart racing, palpitations, lightheadedness, dizziness, syncope, presyncope,  PND or orthopnea. Review of Systems:  Constitutional: No fatigue, weakness, night sweats or fever. HEENT: No new vision difficulties or ringing in the ears. Respiratory: No new SOB, PND, orthopnea or cough. Cardiovascular: See HPI   GI: No n/v, diarrhea, constipation, abdominal pain or changes in bowel habits. No melena, no hematochezia  : No urinary frequency, urgency, incontinence, hematuria or dysuria. Skin: No cyanosis or skin lesions. Musculoskeletal: No new muscle or joint pain. Neurological: No syncope or TIA-like symptoms.   Psychiatric: No anxiety, insomnia or depression     Past Medical History:   Diagnosis Date    Anxiety     Arthritis     Degenerative arthritis     back     Depression     Exocrine pancreatic insufficiency     Dr. Aleena Sethi
score:      Age: 59 years      Sex: Female      Is Non- : No      Diabetic: No      Tobacco smoker: Yes      Systolic Blood Pressure: 573 mmHg      Is BP treated: Yes      HDL Cholesterol: 31 mg/dL      Total Cholesterol: 171 mg/dL    Assessment: 1. Essential hypertension, refractory hypertension  2. PAD - moderate   3. Suspected sleep apnea  4. Tobacco use disorder  5. Hyperlipidemia with goal LDL<130mg/dL    Plan:  Ollie Garcias  has several cardiovascular risk factor, family history of early CAD, smoking and hypertension. I will check tsh, renin activity and aldosterone and plasma metanephrines . We will check renal artery duplex to assess for renal artery stenosis. She is endorsing symptoms of claudication and will have her complete bilateral duplex to assess for PAD. Home BP monitoring encouraged, printed information provided on how to accurately measure BP at home. Counseled to follow a low salt diet to assure blood pressure remains controlled for cardiovascular risk factor modification. The patient is counseled to get regular exercise 3-5 times per week and maintain a healthy weight reduce cardiovascular risk factors. We have instructed her to stop bystolic start carvedilol 12.5 mg BID. May consider 24 hour ambulatory blood pressure monitor in future to assess blood pressure. I have referred to sleep medicine for evaluation and treatment of suspected sleep apnea. I have counseled her on smoking cessation and she is in the contemplative stage. Follow up 8-10 weeks. This note was scribed in the presence of Dr Daria Lara MD by Nas Zamora RN.

## 2023-10-24 DIAGNOSIS — E78.5 HYPERLIPIDEMIA, UNSPECIFIED HYPERLIPIDEMIA TYPE: ICD-10-CM

## 2023-10-25 NOTE — TELEPHONE ENCOUNTER
Trevor Villalba [phone] (home)    is requesting refill(s) of medication Fenofibrate 160 mg tablet to preferred pharmacy [facility] 07/21/23 (pertaining to medication)   Last refill 05/15/23 (per medication requested)  Next office visit scheduled or attempted Yes

## 2023-10-26 RX ORDER — FENOFIBRATE 160 MG/1
TABLET ORAL
Qty: 90 TABLET | Refills: 1 | Status: SHIPPED | OUTPATIENT
Start: 2023-10-26

## 2023-10-27 ENCOUNTER — HOSPITAL ENCOUNTER (OUTPATIENT)
Dept: WOMENS IMAGING | Age: 63
Discharge: HOME | End: 2023-10-27
Payer: COMMERCIAL

## 2023-10-27 VITALS — HEIGHT: 59 IN | BODY MASS INDEX: 30.64 KG/M2 | WEIGHT: 152 LBS

## 2023-10-27 DIAGNOSIS — Z12.31 VISIT FOR SCREENING MAMMOGRAM: ICD-10-CM

## 2023-10-27 PROCEDURE — 77063 BREAST TOMOSYNTHESIS BI: CPT

## 2023-10-30 DIAGNOSIS — E78.5 HYPERLIPIDEMIA, UNSPECIFIED HYPERLIPIDEMIA TYPE: ICD-10-CM

## 2023-10-30 DIAGNOSIS — K21.9 GASTROESOPHAGEAL REFLUX DISEASE, UNSPECIFIED WHETHER ESOPHAGITIS PRESENT: ICD-10-CM

## 2023-10-30 DIAGNOSIS — I10 ESSENTIAL HYPERTENSION: ICD-10-CM

## 2023-10-31 RX ORDER — ATORVASTATIN CALCIUM 40 MG/1
TABLET, FILM COATED ORAL
Qty: 90 TABLET | Refills: 1 | Status: SHIPPED | OUTPATIENT
Start: 2023-10-31

## 2023-10-31 RX ORDER — PANTOPRAZOLE SODIUM 40 MG/1
TABLET, DELAYED RELEASE ORAL
Qty: 90 TABLET | Refills: 1 | Status: SHIPPED | OUTPATIENT
Start: 2023-10-31

## 2023-10-31 RX ORDER — VENLAFAXINE HYDROCHLORIDE 75 MG/1
CAPSULE, EXTENDED RELEASE ORAL
Qty: 90 CAPSULE | Refills: 1 | Status: SHIPPED | OUTPATIENT
Start: 2023-10-31

## 2023-10-31 RX ORDER — AMLODIPINE BESYLATE 5 MG/1
TABLET ORAL
Qty: 90 TABLET | Refills: 1 | Status: SHIPPED | OUTPATIENT
Start: 2023-10-31

## 2023-10-31 NOTE — TELEPHONE ENCOUNTER
Navdeep Elen [phone] (home)    is requesting refill(s) of medication Venlafaxine 150 mg Er tablet, Amlodipine 5 mg tablet, Pantoprazole 40 mg tablet, and Atorvastatin 40 mg tablet to [facility] pharmacy [address] [place] [address] [facility]    Last OV 07/21/23 (pertaining to medication)   Last refill 05/15/23 for pantoprazole and amlodipine, 06/01/23 for atorvastatin, and 06/14/23 for venlafaxine (per medication requested)  Next office visit scheduled or attempted Yes  Date 11/17/23

## 2023-11-11 DIAGNOSIS — F41.9 ANXIETY: ICD-10-CM

## 2023-11-13 RX ORDER — BUSPIRONE HYDROCHLORIDE 10 MG/1
TABLET ORAL
Qty: 270 TABLET | Refills: 1 | Status: SHIPPED | OUTPATIENT
Start: 2023-11-13

## 2023-11-13 NOTE — TELEPHONE ENCOUNTER
Jaxon Snellen [phone] (home)    is requesting refill(s) of medication Buspirone 10 mg tablet to preferred pharmacy [address] [place] [address] 07/21/23 (pertaining to medication)   Last refill 06/01/23 (per medication requested)  Next office visit scheduled or attempted Yes  Date 11/17/23

## 2023-11-17 ENCOUNTER — OFFICE VISIT (OUTPATIENT)
Dept: FAMILY MEDICINE CLINIC | Age: 63
End: 2023-11-17

## 2023-11-17 VITALS
OXYGEN SATURATION: 95 % | BODY MASS INDEX: 31.57 KG/M2 | RESPIRATION RATE: 16 BRPM | HEIGHT: 59 IN | SYSTOLIC BLOOD PRESSURE: 108 MMHG | DIASTOLIC BLOOD PRESSURE: 64 MMHG | HEART RATE: 67 BPM | WEIGHT: 156.6 LBS

## 2023-11-17 DIAGNOSIS — E78.5 HYPERLIPIDEMIA, UNSPECIFIED HYPERLIPIDEMIA TYPE: ICD-10-CM

## 2023-11-17 DIAGNOSIS — F41.9 ANXIETY: ICD-10-CM

## 2023-11-17 DIAGNOSIS — I10 ESSENTIAL HYPERTENSION: Primary | ICD-10-CM

## 2023-11-17 LAB
ALBUMIN SERPL-MCNC: 4.2 G/DL (ref 3.4–5)
ALBUMIN/GLOB SERPL: 1.6 {RATIO} (ref 1.1–2.2)
ALP SERPL-CCNC: 52 U/L (ref 40–129)
ALT SERPL-CCNC: 18 U/L (ref 10–40)
ANION GAP SERPL CALCULATED.3IONS-SCNC: 10 MMOL/L (ref 3–16)
AST SERPL-CCNC: 22 U/L (ref 15–37)
BILIRUB SERPL-MCNC: 0.3 MG/DL (ref 0–1)
BUN SERPL-MCNC: 15 MG/DL (ref 7–20)
CALCIUM SERPL-MCNC: 10.3 MG/DL (ref 8.3–10.6)
CHLORIDE SERPL-SCNC: 105 MMOL/L (ref 99–110)
CHOLEST SERPL-MCNC: 167 MG/DL (ref 0–199)
CO2 SERPL-SCNC: 28 MMOL/L (ref 21–32)
CREAT SERPL-MCNC: 0.8 MG/DL (ref 0.6–1.2)
GFR SERPLBLD CREATININE-BSD FMLA CKD-EPI: >60 ML/MIN/{1.73_M2}
GLUCOSE SERPL-MCNC: 114 MG/DL (ref 70–99)
HDLC SERPL-MCNC: 30 MG/DL (ref 40–60)
LDLC SERPL CALC-MCNC: 93 MG/DL
POTASSIUM SERPL-SCNC: 4 MMOL/L (ref 3.5–5.1)
PROT SERPL-MCNC: 6.8 G/DL (ref 6.4–8.2)
SODIUM SERPL-SCNC: 143 MMOL/L (ref 136–145)
TRIGL SERPL-MCNC: 221 MG/DL (ref 0–150)
VLDLC SERPL CALC-MCNC: 44 MG/DL

## 2023-11-17 RX ORDER — CLONIDINE HYDROCHLORIDE 0.1 MG/1
0.1 TABLET ORAL DAILY
Qty: 90 TABLET | Refills: 0 | Status: SHIPPED | OUTPATIENT
Start: 2023-11-17

## 2023-11-17 RX ORDER — FAMOTIDINE 40 MG/1
TABLET, FILM COATED ORAL
COMMUNITY
Start: 2023-09-28

## 2023-11-17 ASSESSMENT — PATIENT HEALTH QUESTIONNAIRE - PHQ9
SUM OF ALL RESPONSES TO PHQ QUESTIONS 1-9: 8
8. MOVING OR SPEAKING SO SLOWLY THAT OTHER PEOPLE COULD HAVE NOTICED. OR THE OPPOSITE, BEING SO FIGETY OR RESTLESS THAT YOU HAVE BEEN MOVING AROUND A LOT MORE THAN USUAL: 1
1. LITTLE INTEREST OR PLEASURE IN DOING THINGS: 2
2. FEELING DOWN, DEPRESSED OR HOPELESS: 1
SUM OF ALL RESPONSES TO PHQ QUESTIONS 1-9: 8
10. IF YOU CHECKED OFF ANY PROBLEMS, HOW DIFFICULT HAVE THESE PROBLEMS MADE IT FOR YOU TO DO YOUR WORK, TAKE CARE OF THINGS AT HOME, OR GET ALONG WITH OTHER PEOPLE: 0
7. TROUBLE CONCENTRATING ON THINGS, SUCH AS READING THE NEWSPAPER OR WATCHING TELEVISION: 0
4. FEELING TIRED OR HAVING LITTLE ENERGY: 3
6. FEELING BAD ABOUT YOURSELF - OR THAT YOU ARE A FAILURE OR HAVE LET YOURSELF OR YOUR FAMILY DOWN: 0
5. POOR APPETITE OR OVEREATING: 1
SUM OF ALL RESPONSES TO PHQ QUESTIONS 1-9: 8
SUM OF ALL RESPONSES TO PHQ9 QUESTIONS 1 & 2: 3
3. TROUBLE FALLING OR STAYING ASLEEP: 0
SUM OF ALL RESPONSES TO PHQ QUESTIONS 1-9: 8

## 2023-11-17 ASSESSMENT — COLUMBIA-SUICIDE SEVERITY RATING SCALE - C-SSRS
7. DID THIS OCCUR IN THE LAST THREE MONTHS: NO
4. HAVE YOU HAD THESE THOUGHTS AND HAD SOME INTENTION OF ACTING ON THEM?: NO
5. HAVE YOU STARTED TO WORK OUT OR WORKED OUT THE DETAILS OF HOW TO KILL YOURSELF? DO YOU INTEND TO CARRY OUT THIS PLAN?: NO
3. HAVE YOU BEEN THINKING ABOUT HOW YOU MIGHT KILL YOURSELF?: NO

## 2023-11-17 NOTE — PROGRESS NOTES
years: 0.00     Total pack years: 40.00     Types: Cigarettes     Start date: 6/19/1983    Smokeless tobacco: Never    Tobacco comments:     longest stopped for both pregnancies   Substance Use Topics    Alcohol use: Not Currently     Comment: rarely once in great while       OBJECTIVE:  S1 and S2 normal, no murmurs, clicks, gallops or rubs. Regular rate and rhythm. Chest is clear; no wheezes or rales. No edema or JVD. ASSESSMENT/Plan:     Diagnosis Orders   1. Essential hypertension  Comprehensive Metabolic Panel- well controlled on norvasc, coreg and catarpes      2. Hyperlipidemia, unspecified hyperlipidemia type  Lipid Panel- well controlled on lipitor and triglide    Comprehensive Metabolic Panel      3. Anxiety  Doing well on effexor.

## 2023-12-28 RX ORDER — LOSARTAN POTASSIUM 100 MG/1
TABLET ORAL
Qty: 90 TABLET | Refills: 1 | Status: SHIPPED | OUTPATIENT
Start: 2023-12-28

## 2023-12-28 RX ORDER — HYDROCHLOROTHIAZIDE 12.5 MG/1
CAPSULE, GELATIN COATED ORAL
Qty: 90 CAPSULE | Refills: 1 | Status: SHIPPED | OUTPATIENT
Start: 2023-12-28

## 2023-12-28 NOTE — TELEPHONE ENCOUNTER
Keya Weiner is requesting refill(s) losartan  Last OV 11/17/23 (pertaining to medication)  LR 7/14/23 (per medication requested)  Next office visit scheduled or attempted No   If no, reason:  pt is due in May      Keya Weiner is requesting refill(s) HCTZ  Last OV 11/17/23 (pertaining to medication)  LR 7/3/23 (per medication requested)  Next office visit scheduled or attempted No   If no, reason:

## 2023-12-28 NOTE — TELEPHONE ENCOUNTER
Eleazar Hickey [phone] (home)    is requesting refill(s) of medication Losartan 100 mg Tablet and Hydrochlorothiazide 12.5 mg Capsule to [facility]    Last OV 11/17/23 (pertaining to medication)   Last refill 07/03/23 for Hydrochlorothiazide and 07/14/23 for Losartan (per medication requested)  Next office visit scheduled or attempted Yes  Pt did not want to schedule 6 months follow up due on 05/27/24

## 2024-01-15 RX ORDER — FERROUS SULFATE 325(65) MG
1 TABLET ORAL
Qty: 30 TABLET | Refills: 11 | OUTPATIENT
Start: 2024-01-15

## 2024-02-05 NOTE — TELEPHONE ENCOUNTER
Sandie Marie [phone] (home)    is requesting refill(s) of medication venlafaxine 150 ER Capsule to preferred pharmacy [facility]    Last OV 11/17/23 (pertaining to medication)   Last refill 04/21/23 (per medication requested)  Next office visit scheduled or attempted Yes  Pt will call back to schedule 6 months follow up due in May

## 2024-02-06 RX ORDER — VENLAFAXINE HYDROCHLORIDE 150 MG/1
150 CAPSULE, EXTENDED RELEASE ORAL DAILY
Qty: 90 CAPSULE | Refills: 1 | Status: SHIPPED | OUTPATIENT
Start: 2024-02-06

## 2024-02-12 NOTE — TELEPHONE ENCOUNTER
Sandie Marie [phone] (home)    is requesting refill(s) of medication Clonidine 0.1 mg Tablet to [facility]    Last OV 11/17/23 (pertaining to medication)   Last refill 11/17/23 (per medication requested)  Next office visit scheduled or attempted Yes  Talked with Pt who said she will call in May to schedule 6 months follow up.

## 2024-02-13 RX ORDER — CLONIDINE HYDROCHLORIDE 0.1 MG/1
0.1 TABLET ORAL DAILY
Qty: 90 TABLET | Refills: 0 | Status: SHIPPED | OUTPATIENT
Start: 2024-02-13

## 2024-03-20 ENCOUNTER — OFFICE VISIT (OUTPATIENT)
Dept: CARDIOLOGY CLINIC | Age: 64
End: 2024-03-20
Payer: COMMERCIAL

## 2024-03-20 VITALS
BODY MASS INDEX: 31.45 KG/M2 | DIASTOLIC BLOOD PRESSURE: 60 MMHG | HEART RATE: 71 BPM | HEIGHT: 59 IN | SYSTOLIC BLOOD PRESSURE: 136 MMHG | WEIGHT: 156 LBS | OXYGEN SATURATION: 96 %

## 2024-03-20 DIAGNOSIS — E78.5 HYPERLIPIDEMIA LDL GOAL <70: ICD-10-CM

## 2024-03-20 DIAGNOSIS — I10 ESSENTIAL HYPERTENSION: Primary | ICD-10-CM

## 2024-03-20 DIAGNOSIS — Z72.0 TOBACCO ABUSE DISORDER: ICD-10-CM

## 2024-03-20 DIAGNOSIS — G47.33 OSA (OBSTRUCTIVE SLEEP APNEA): ICD-10-CM

## 2024-03-20 DIAGNOSIS — I73.9 PAD (PERIPHERAL ARTERY DISEASE) (HCC): ICD-10-CM

## 2024-03-20 PROCEDURE — 3017F COLORECTAL CA SCREEN DOC REV: CPT | Performed by: INTERNAL MEDICINE

## 2024-03-20 PROCEDURE — 99214 OFFICE O/P EST MOD 30 MIN: CPT | Performed by: INTERNAL MEDICINE

## 2024-03-20 PROCEDURE — 4004F PT TOBACCO SCREEN RCVD TLK: CPT | Performed by: INTERNAL MEDICINE

## 2024-03-20 PROCEDURE — G8427 DOCREV CUR MEDS BY ELIG CLIN: HCPCS | Performed by: INTERNAL MEDICINE

## 2024-03-20 PROCEDURE — 3075F SYST BP GE 130 - 139MM HG: CPT | Performed by: INTERNAL MEDICINE

## 2024-03-20 PROCEDURE — G8482 FLU IMMUNIZE ORDER/ADMIN: HCPCS | Performed by: INTERNAL MEDICINE

## 2024-03-20 PROCEDURE — 3078F DIAST BP <80 MM HG: CPT | Performed by: INTERNAL MEDICINE

## 2024-03-20 PROCEDURE — G8417 CALC BMI ABV UP PARAM F/U: HCPCS | Performed by: INTERNAL MEDICINE

## 2024-03-20 NOTE — PROGRESS NOTES
medications for this visit.       Physical Exam:   /60 (Site: Left Upper Arm, Position: Sitting, Cuff Size: Medium Adult)   Pulse 71   Ht 1.499 m (4' 11.02\")   Wt 70.8 kg (156 lb)   SpO2 96%   BMI 31.49 kg/m²   No intake or output data in the 24 hours ending 03/20/24 1658  Wt Readings from Last 2 Encounters:   03/20/24 70.8 kg (156 lb)   11/17/23 71 kg (156 lb 9.6 oz)     Constitutional: She is oriented to person, place, and time. She appears well-developed and well-nourished. In no acute distress.   Head: Normocephalic and atraumatic.     Neck: Neck supple. No JVD present. Carotid bruit is not present. No mass and no thyromegaly present. No lymphadenopathy present.  Cardiovascular: Normal rate, regular rhythm, normal heart sounds and intact distal pulses.  Exam reveals no gallop and no friction rub.  No murmur heard.  Pulmonary/Chest: Effort normal and breath sounds normal. No respiratory distress. She has no wheezes, rhonchi or rales.   Abdominal: Soft, non-tender. Bowel sounds and aorta are normal. She exhibits no organomegaly, mass or bruit.   Extremities: No edema, cyanosis, or clubbing. Pulses are 2+ radial/carotid/dorsalis pedis and posterior tibial bilaterally.  Neurological: She is alert and oriented to person, place, and time. She has normal reflexes. No cranial nerve deficit. Coordination normal.   Skin: Skin is warm and dry. There is no rash or diaphoresis.   Psychiatric: She has a normal mood and affect. Her speech is normal and behavior is normal.     Personally reviewed and interpreted     EKG Interpretation: 08/09/2023 reviewed,  Sinus bradycardia with v-rate of 57 bpm with QRS duration 90 ms. No pathologic Q waves, ventricular pre-excitation, or QT prolongation.    Image Review:     Renal Duplex 8/31/23  Impressions  Right Impression  There is no evidence to suggest renal artery stenosis.  The right renal vein is patent.  The right kidney measures 11.23 cm.  Left Impression  There is no

## 2024-04-29 RX ORDER — CLONIDINE HYDROCHLORIDE 0.1 MG/1
0.1 TABLET ORAL DAILY
Qty: 90 TABLET | Refills: 0 | Status: SHIPPED | OUTPATIENT
Start: 2024-04-29

## 2024-04-29 RX ORDER — HYDROCHLOROTHIAZIDE 12.5 MG/1
CAPSULE, GELATIN COATED ORAL
Qty: 90 CAPSULE | Refills: 1 | Status: SHIPPED | OUTPATIENT
Start: 2024-04-29

## 2024-04-29 RX ORDER — LOSARTAN POTASSIUM 100 MG/1
TABLET ORAL
Qty: 90 TABLET | Refills: 1 | Status: SHIPPED | OUTPATIENT
Start: 2024-04-29

## 2024-04-29 NOTE — TELEPHONE ENCOUNTER
Sandie Marie [phone] (home)    is requesting refill(s) of medication Hydrochlorothiazide 12.5 mg capsule, Losartan 100 mg Tablet, and Clonidine 0.1 mg Tablet  to preferred [facility]    Last OV 11/17/23 (pertaining to medication)   Last refill 12/28/23 for Hydrochlorothiazide and Losartan, 02/13/24 for Clonidine  (per medication requested)  Next office visit scheduled or attempted Yes  05/07/24

## 2024-05-09 ENCOUNTER — OFFICE VISIT (OUTPATIENT)
Dept: FAMILY MEDICINE CLINIC | Age: 64
End: 2024-05-09
Payer: COMMERCIAL

## 2024-05-09 VITALS
OXYGEN SATURATION: 96 % | HEIGHT: 59 IN | DIASTOLIC BLOOD PRESSURE: 58 MMHG | TEMPERATURE: 97.3 F | HEART RATE: 68 BPM | RESPIRATION RATE: 18 BRPM | WEIGHT: 156 LBS | BODY MASS INDEX: 31.45 KG/M2 | SYSTOLIC BLOOD PRESSURE: 112 MMHG

## 2024-05-09 DIAGNOSIS — F41.9 ANXIETY: ICD-10-CM

## 2024-05-09 DIAGNOSIS — I10 ESSENTIAL HYPERTENSION: ICD-10-CM

## 2024-05-09 DIAGNOSIS — F41.0 PANIC ATTACK: ICD-10-CM

## 2024-05-09 DIAGNOSIS — R25.2 LEG CRAMPS: ICD-10-CM

## 2024-05-09 DIAGNOSIS — F17.200 TOBACCO USE DISORDER: Primary | ICD-10-CM

## 2024-05-09 PROCEDURE — 3074F SYST BP LT 130 MM HG: CPT | Performed by: PHYSICIAN ASSISTANT

## 2024-05-09 PROCEDURE — G8427 DOCREV CUR MEDS BY ELIG CLIN: HCPCS | Performed by: PHYSICIAN ASSISTANT

## 2024-05-09 PROCEDURE — 4004F PT TOBACCO SCREEN RCVD TLK: CPT | Performed by: PHYSICIAN ASSISTANT

## 2024-05-09 PROCEDURE — 3078F DIAST BP <80 MM HG: CPT | Performed by: PHYSICIAN ASSISTANT

## 2024-05-09 PROCEDURE — 99214 OFFICE O/P EST MOD 30 MIN: CPT | Performed by: PHYSICIAN ASSISTANT

## 2024-05-09 PROCEDURE — G8417 CALC BMI ABV UP PARAM F/U: HCPCS | Performed by: PHYSICIAN ASSISTANT

## 2024-05-09 PROCEDURE — 3017F COLORECTAL CA SCREEN DOC REV: CPT | Performed by: PHYSICIAN ASSISTANT

## 2024-05-09 RX ORDER — PANCRELIPASE LIPASE, PANCRELIPASE PROTEASE, PANCRELIPASE AMYLASE 40000; 126000; 168000 [USP'U]/1; [USP'U]/1; [USP'U]/1
1 CAPSULE, DELAYED RELEASE ORAL
COMMUNITY
Start: 2024-04-29

## 2024-05-09 NOTE — PROGRESS NOTES
[facility] MEDICINE  05/09/24       IMPRESSION/ PLAN  1. Tobacco use disorder    2. Anxiety    3. Panic attack    4. Leg cramps    5. Essential hypertension      -Reassured symptoms not consistent with DVT or new cardiovascular issue.  However if symptoms worse go to ER.  Findings more consistent with recent anxiety exacerbation due to 's excessive alcohol use, falling being on blood thinners.  Discussed Al-Anon and gave information with phone number..  States she spoke with someone else who recommended this and will consider going.  Madill better knowing issue today was not cardiovascular.  For leg cramps recommended starting a multivitamin      CHIEF COMPLAINT  Chief Complaint   Patient presents with    Leg Pain     Patient is here with complaints of pain in her legs for a few weeks    Anxiety     Patient mentions she has been having panic attacks     HISTORY OF PRESENT  ILLNESS  Sandie Marie is a 63 y.o.  female with multiple medical concerns.  C/o leg cramps behind knees for 2 weeks without calf redness or edema.  Denies distal limb coolness or warmth or numbness.   C/o tachycardia, nervousness, dizzy spells and panic attacks.  Admits her anxiety she has had for years has worsened due to recent increase and alcohol use by her .  This has made her very anxious as he falls often and is on blood thinner.   Currently on BuSpar and Effexor.  Used Xanax many years ago prior to dr Watkins.    Associated with shortness of breath not on exertion.  No chest pain.  No arm weakness or numbness.  No backache.  Because of leg cramps together with  palpitations she became concerned.    Last cardiologist appointment 3/20/24- she did not schedule for sleep study as recommended.     ROS:  Remaining reviewed and are unremarkable for other constitutional, EENT, cardiac, pulmonary, GI, , neurologic, musculoskeletal, or integumentary complaints.      PAST MEDICAL/SURGICAL, SOCIAL, &  FAMILY

## 2024-05-14 ENCOUNTER — OFFICE VISIT (OUTPATIENT)
Dept: FAMILY MEDICINE CLINIC | Age: 64
End: 2024-05-14
Payer: COMMERCIAL

## 2024-05-14 VITALS
OXYGEN SATURATION: 95 % | DIASTOLIC BLOOD PRESSURE: 72 MMHG | RESPIRATION RATE: 16 BRPM | BODY MASS INDEX: 31.37 KG/M2 | HEART RATE: 62 BPM | WEIGHT: 155.6 LBS | SYSTOLIC BLOOD PRESSURE: 132 MMHG | HEIGHT: 59 IN

## 2024-05-14 DIAGNOSIS — H10.32 ACUTE BACTERIAL CONJUNCTIVITIS OF LEFT EYE: ICD-10-CM

## 2024-05-14 DIAGNOSIS — E78.5 HYPERLIPIDEMIA, UNSPECIFIED HYPERLIPIDEMIA TYPE: ICD-10-CM

## 2024-05-14 DIAGNOSIS — F41.9 ANXIETY: ICD-10-CM

## 2024-05-14 DIAGNOSIS — I10 ESSENTIAL HYPERTENSION: Primary | ICD-10-CM

## 2024-05-14 PROCEDURE — G8417 CALC BMI ABV UP PARAM F/U: HCPCS | Performed by: PHYSICIAN ASSISTANT

## 2024-05-14 PROCEDURE — G8427 DOCREV CUR MEDS BY ELIG CLIN: HCPCS | Performed by: PHYSICIAN ASSISTANT

## 2024-05-14 PROCEDURE — 4004F PT TOBACCO SCREEN RCVD TLK: CPT | Performed by: PHYSICIAN ASSISTANT

## 2024-05-14 PROCEDURE — 99214 OFFICE O/P EST MOD 30 MIN: CPT | Performed by: PHYSICIAN ASSISTANT

## 2024-05-14 PROCEDURE — 3075F SYST BP GE 130 - 139MM HG: CPT | Performed by: PHYSICIAN ASSISTANT

## 2024-05-14 PROCEDURE — 3017F COLORECTAL CA SCREEN DOC REV: CPT | Performed by: PHYSICIAN ASSISTANT

## 2024-05-14 PROCEDURE — 3078F DIAST BP <80 MM HG: CPT | Performed by: PHYSICIAN ASSISTANT

## 2024-05-14 RX ORDER — VENLAFAXINE HYDROCHLORIDE 150 MG/1
150 CAPSULE, EXTENDED RELEASE ORAL DAILY
Qty: 90 CAPSULE | Refills: 1 | Status: SHIPPED | OUTPATIENT
Start: 2024-05-14

## 2024-05-14 RX ORDER — AMLODIPINE BESYLATE 5 MG/1
5 TABLET ORAL DAILY
Qty: 90 TABLET | Refills: 1 | Status: SHIPPED | OUTPATIENT
Start: 2024-05-14

## 2024-05-14 RX ORDER — SULFACETAMIDE SODIUM 100 MG/ML
2 SOLUTION/ DROPS OPHTHALMIC 4 TIMES DAILY
Qty: 5 ML | Refills: 0 | Status: SHIPPED | OUTPATIENT
Start: 2024-05-14 | End: 2024-05-24

## 2024-05-14 RX ORDER — BUSPIRONE HYDROCHLORIDE 10 MG/1
10 TABLET ORAL 3 TIMES DAILY
Qty: 270 TABLET | Refills: 1 | Status: SHIPPED | OUTPATIENT
Start: 2024-05-14

## 2024-05-14 RX ORDER — PANTOPRAZOLE SODIUM 40 MG/1
40 TABLET, DELAYED RELEASE ORAL DAILY
Qty: 90 TABLET | Refills: 1 | Status: SHIPPED | OUTPATIENT
Start: 2024-05-14

## 2024-05-14 RX ORDER — ATORVASTATIN CALCIUM 40 MG/1
40 TABLET, FILM COATED ORAL DAILY
Qty: 90 TABLET | Refills: 1 | Status: SHIPPED | OUTPATIENT
Start: 2024-05-14

## 2024-05-14 RX ORDER — FENOFIBRATE 160 MG/1
160 TABLET ORAL DAILY
Qty: 90 TABLET | Refills: 1 | Status: SHIPPED | OUTPATIENT
Start: 2024-05-14

## 2024-05-14 RX ORDER — VENLAFAXINE HYDROCHLORIDE 75 MG/1
CAPSULE, EXTENDED RELEASE ORAL
Qty: 90 CAPSULE | Refills: 1 | Status: SHIPPED | OUTPATIENT
Start: 2024-05-14

## 2024-05-14 ASSESSMENT — PATIENT HEALTH QUESTIONNAIRE - PHQ9
SUM OF ALL RESPONSES TO PHQ QUESTIONS 1-9: 13
7. TROUBLE CONCENTRATING ON THINGS, SUCH AS READING THE NEWSPAPER OR WATCHING TELEVISION: NEARLY EVERY DAY
5. POOR APPETITE OR OVEREATING: NOT AT ALL
6. FEELING BAD ABOUT YOURSELF - OR THAT YOU ARE A FAILURE OR HAVE LET YOURSELF OR YOUR FAMILY DOWN: NOT AT ALL
10. IF YOU CHECKED OFF ANY PROBLEMS, HOW DIFFICULT HAVE THESE PROBLEMS MADE IT FOR YOU TO DO YOUR WORK, TAKE CARE OF THINGS AT HOME, OR GET ALONG WITH OTHER PEOPLE: NOT DIFFICULT AT ALL
3. TROUBLE FALLING OR STAYING ASLEEP: NEARLY EVERY DAY
2. FEELING DOWN, DEPRESSED OR HOPELESS: SEVERAL DAYS
SUM OF ALL RESPONSES TO PHQ QUESTIONS 1-9: 13
8. MOVING OR SPEAKING SO SLOWLY THAT OTHER PEOPLE COULD HAVE NOTICED. OR THE OPPOSITE, BEING SO FIGETY OR RESTLESS THAT YOU HAVE BEEN MOVING AROUND A LOT MORE THAN USUAL: NOT AT ALL
SUM OF ALL RESPONSES TO PHQ QUESTIONS 1-9: 13
1. LITTLE INTEREST OR PLEASURE IN DOING THINGS: NEARLY EVERY DAY
SUM OF ALL RESPONSES TO PHQ9 QUESTIONS 1 & 2: 4
4. FEELING TIRED OR HAVING LITTLE ENERGY: NEARLY EVERY DAY
SUM OF ALL RESPONSES TO PHQ QUESTIONS 1-9: 13

## 2024-05-14 ASSESSMENT — COLUMBIA-SUICIDE SEVERITY RATING SCALE - C-SSRS
2. HAVE YOU ACTUALLY HAD ANY THOUGHTS OF KILLING YOURSELF?: NO
1. WITHIN THE PAST MONTH, HAVE YOU WISHED YOU WERE DEAD OR WISHED YOU COULD GO TO SLEEP AND NOT WAKE UP?: NO
6. HAVE YOU EVER DONE ANYTHING, STARTED TO DO ANYTHING, OR PREPARED TO DO ANYTHING TO END YOUR LIFE?: NO

## 2024-05-14 NOTE — PROGRESS NOTES
SUBJECTIVE:  Sandie Marie is a 63 y.o. female who presents for evaluation of hypertension and hyperlipidemia. She indicates that she is feeling well and denies any symptoms referable to her elevated blood pressure.   Specifically denies chest pain, palpitations, dyspnea, orthopnea, PND or peripheral edema.  No anorexia, arthralgia, or leg cramps noted. Current medication regimen is as listed below. She denies any side effects of medication, and has been taking it regularly.    Current Outpatient Medications   Medication Sig Dispense Refill    sulfacetamide (BLEPH-10) 10 % ophthalmic solution Place 2 drops into the left eye 4 times daily for 10 days 5 mL 0    ZENPEP 25861-364422 units CPEP Take 1 capsule by mouth 3 times daily (with meals)      hydroCHLOROthiazide 12.5 MG capsule TAKE 1 CAPSULE BY MOUTH EVERY DAY IN THE MORNING 90 capsule 1    losartan (COZAAR) 100 MG tablet TAKE 1 TABLET BY MOUTH EVERY DAY 90 tablet 1    cloNIDine (CATAPRES) 0.1 MG tablet TAKE 1 TABLET BY MOUTH EVERY DAY 90 tablet 0    venlafaxine (EFFEXOR XR) 150 MG extended release capsule Take 1 capsule by mouth daily 90 capsule 1    famotidine (PEPCID) 40 MG tablet 1 (ONE) TABLET IN THE EVENING      busPIRone (BUSPAR) 10 MG tablet TAKE 1 TABLET 3 TIMES A  tablet 1    atorvastatin (LIPITOR) 40 MG tablet TAKE 1 TABLET DAILY 90 tablet 1    venlafaxine (EFFEXOR XR) 75 MG extended release capsule TAKE 1 CAPSULE DAILY 90 capsule 1    amLODIPine (NORVASC) 5 MG tablet TAKE 1 TABLET DAILY 90 tablet 1    pantoprazole (PROTONIX) 40 MG tablet TAKE 1 TABLET DAILY 90 tablet 1    fenofibrate (TRIGLIDE) 160 MG tablet TAKE 1 TABLET DAILY 90 tablet 1    carvedilol (COREG) 12.5 MG tablet Take 1 tablet by mouth 2 times daily 180 tablet 3     No current facility-administered medications for this visit.       Allergies   Allergen Reactions    Erythromycin Nausea Only       Social History     Tobacco Use    Smoking status: Every Day     Current packs/day: 1.00

## 2024-05-24 ENCOUNTER — OFFICE VISIT (OUTPATIENT)
Dept: FAMILY MEDICINE CLINIC | Age: 64
End: 2024-05-24
Payer: COMMERCIAL

## 2024-05-24 VITALS
OXYGEN SATURATION: 96 % | SYSTOLIC BLOOD PRESSURE: 108 MMHG | HEIGHT: 59 IN | WEIGHT: 153.8 LBS | RESPIRATION RATE: 16 BRPM | HEART RATE: 62 BPM | DIASTOLIC BLOOD PRESSURE: 68 MMHG | BODY MASS INDEX: 31 KG/M2

## 2024-05-24 DIAGNOSIS — H00.15 CHALAZION OF LEFT LOWER EYELID: Primary | ICD-10-CM

## 2024-05-24 DIAGNOSIS — H10.9 CONJUNCTIVITIS OF LEFT EYE, UNSPECIFIED CONJUNCTIVITIS TYPE: ICD-10-CM

## 2024-05-24 PROCEDURE — 3017F COLORECTAL CA SCREEN DOC REV: CPT | Performed by: PHYSICIAN ASSISTANT

## 2024-05-24 PROCEDURE — 3074F SYST BP LT 130 MM HG: CPT | Performed by: PHYSICIAN ASSISTANT

## 2024-05-24 PROCEDURE — G8427 DOCREV CUR MEDS BY ELIG CLIN: HCPCS | Performed by: PHYSICIAN ASSISTANT

## 2024-05-24 PROCEDURE — 3078F DIAST BP <80 MM HG: CPT | Performed by: PHYSICIAN ASSISTANT

## 2024-05-24 PROCEDURE — 4004F PT TOBACCO SCREEN RCVD TLK: CPT | Performed by: PHYSICIAN ASSISTANT

## 2024-05-24 PROCEDURE — G8417 CALC BMI ABV UP PARAM F/U: HCPCS | Performed by: PHYSICIAN ASSISTANT

## 2024-05-24 PROCEDURE — 99213 OFFICE O/P EST LOW 20 MIN: CPT | Performed by: PHYSICIAN ASSISTANT

## 2024-05-24 RX ORDER — ERYTHROMYCIN 5 MG/G
OINTMENT OPHTHALMIC
Qty: 1 G | Refills: 0 | Status: SHIPPED | OUTPATIENT
Start: 2024-05-24 | End: 2024-06-03

## 2024-05-24 ASSESSMENT — ENCOUNTER SYMPTOMS
EYE DISCHARGE: 1
EYE REDNESS: 1
EYE PAIN: 1

## 2024-05-24 NOTE — PROGRESS NOTES
Subjective   Patient ID: Sandie Marie is a 63 y.o. female.    HPI   Patient here for follow up of eye redness. She took the bleph 10 for 8 days and thing seemed to have worsened. She now has a stye like lesion left lower lid and with using the drop felt a burning sensation along there lower lid and redness has not improved.     Review of Systems   Constitutional: Negative.    HENT: Negative.     Eyes:  Positive for pain, discharge and redness.          Objective   Physical Exam  Eyes:      Conjunctiva/sclera:      Left eye: Left conjunctiva is injected.        Comments: Nodule - ulceration inside eye lid            Assessment /Plan:     Diagnosis Orders   1. Chalazion of left lower eyelid  Warm compresses      2. Conjunctivitis of left eye, unspecified conjunctivitis type  Will start erythromycin ointment- to schedule with eye md                  MARY Montalvo

## 2024-06-21 RX ORDER — CARVEDILOL 12.5 MG/1
12.5 TABLET ORAL 2 TIMES DAILY
Qty: 180 TABLET | Refills: 3 | Status: SHIPPED | OUTPATIENT
Start: 2024-06-21

## 2024-07-15 RX ORDER — CLONIDINE HYDROCHLORIDE 0.1 MG/1
0.1 TABLET ORAL DAILY
Qty: 90 TABLET | Refills: 0 | Status: SHIPPED | OUTPATIENT
Start: 2024-07-15

## 2024-07-15 NOTE — TELEPHONE ENCOUNTER
Sandie Marie is requesting refill(s) Clonidine  Last OV 5/14/24 (pertaining to medication)  LR 4/29/24 (per medication requested)  Next office visit scheduled or attempted No   If no, reason:  pt seen every 6 months in past, due 11/14/24     no fever/no chills/no sweating

## 2024-09-24 ENCOUNTER — OFFICE VISIT (OUTPATIENT)
Dept: CARDIOLOGY CLINIC | Age: 64
End: 2024-09-24
Payer: COMMERCIAL

## 2024-09-24 VITALS
HEIGHT: 59 IN | BODY MASS INDEX: 31.25 KG/M2 | DIASTOLIC BLOOD PRESSURE: 64 MMHG | HEART RATE: 63 BPM | WEIGHT: 155 LBS | OXYGEN SATURATION: 96 % | SYSTOLIC BLOOD PRESSURE: 136 MMHG

## 2024-09-24 DIAGNOSIS — R53.83 FATIGUE, UNSPECIFIED TYPE: ICD-10-CM

## 2024-09-24 DIAGNOSIS — R29.818 SUSPECTED SLEEP APNEA: ICD-10-CM

## 2024-09-24 DIAGNOSIS — I73.9 PAD (PERIPHERAL ARTERY DISEASE) (HCC): ICD-10-CM

## 2024-09-24 DIAGNOSIS — I10 ESSENTIAL HYPERTENSION: Primary | ICD-10-CM

## 2024-09-24 DIAGNOSIS — F17.200 TOBACCO USE DISORDER: ICD-10-CM

## 2024-09-24 DIAGNOSIS — E78.5 HYPERLIPIDEMIA LDL GOAL <70: ICD-10-CM

## 2024-09-24 PROCEDURE — 3074F SYST BP LT 130 MM HG: CPT | Performed by: INTERNAL MEDICINE

## 2024-09-24 PROCEDURE — 93000 ELECTROCARDIOGRAM COMPLETE: CPT | Performed by: INTERNAL MEDICINE

## 2024-09-24 PROCEDURE — 4004F PT TOBACCO SCREEN RCVD TLK: CPT | Performed by: INTERNAL MEDICINE

## 2024-09-24 PROCEDURE — 99214 OFFICE O/P EST MOD 30 MIN: CPT | Performed by: INTERNAL MEDICINE

## 2024-09-24 PROCEDURE — G8427 DOCREV CUR MEDS BY ELIG CLIN: HCPCS | Performed by: INTERNAL MEDICINE

## 2024-09-24 PROCEDURE — G8417 CALC BMI ABV UP PARAM F/U: HCPCS | Performed by: INTERNAL MEDICINE

## 2024-09-24 PROCEDURE — 3017F COLORECTAL CA SCREEN DOC REV: CPT | Performed by: INTERNAL MEDICINE

## 2024-09-24 PROCEDURE — 3078F DIAST BP <80 MM HG: CPT | Performed by: INTERNAL MEDICINE

## 2024-09-25 ENCOUNTER — TELEPHONE (OUTPATIENT)
Dept: CARDIOLOGY CLINIC | Age: 64
End: 2024-09-25

## 2024-09-25 DIAGNOSIS — R53.83 FATIGUE, UNSPECIFIED TYPE: ICD-10-CM

## 2024-09-25 DIAGNOSIS — I10 ESSENTIAL HYPERTENSION: ICD-10-CM

## 2024-09-25 DIAGNOSIS — G47.33 OSA (OBSTRUCTIVE SLEEP APNEA): Primary | ICD-10-CM

## 2024-09-25 DIAGNOSIS — R29.818 SUSPECTED SLEEP APNEA: ICD-10-CM

## 2024-10-08 ENCOUNTER — OFFICE VISIT (OUTPATIENT)
Dept: FAMILY MEDICINE CLINIC | Age: 64
End: 2024-10-08
Payer: COMMERCIAL

## 2024-10-08 VITALS
OXYGEN SATURATION: 98 % | HEART RATE: 64 BPM | WEIGHT: 155.8 LBS | BODY MASS INDEX: 31.45 KG/M2 | DIASTOLIC BLOOD PRESSURE: 58 MMHG | SYSTOLIC BLOOD PRESSURE: 140 MMHG

## 2024-10-08 DIAGNOSIS — R06.83 SNORING: Primary | ICD-10-CM

## 2024-10-08 DIAGNOSIS — L98.9 SORE ON SCALP: ICD-10-CM

## 2024-10-08 PROCEDURE — 4004F PT TOBACCO SCREEN RCVD TLK: CPT | Performed by: PHYSICIAN ASSISTANT

## 2024-10-08 PROCEDURE — G2211 COMPLEX E/M VISIT ADD ON: HCPCS | Performed by: PHYSICIAN ASSISTANT

## 2024-10-08 PROCEDURE — 3077F SYST BP >= 140 MM HG: CPT | Performed by: PHYSICIAN ASSISTANT

## 2024-10-08 PROCEDURE — 3078F DIAST BP <80 MM HG: CPT | Performed by: PHYSICIAN ASSISTANT

## 2024-10-08 PROCEDURE — 99214 OFFICE O/P EST MOD 30 MIN: CPT | Performed by: PHYSICIAN ASSISTANT

## 2024-10-08 PROCEDURE — 3017F COLORECTAL CA SCREEN DOC REV: CPT | Performed by: PHYSICIAN ASSISTANT

## 2024-10-08 PROCEDURE — G8417 CALC BMI ABV UP PARAM F/U: HCPCS | Performed by: PHYSICIAN ASSISTANT

## 2024-10-08 PROCEDURE — G8484 FLU IMMUNIZE NO ADMIN: HCPCS | Performed by: PHYSICIAN ASSISTANT

## 2024-10-08 PROCEDURE — G8427 DOCREV CUR MEDS BY ELIG CLIN: HCPCS | Performed by: PHYSICIAN ASSISTANT

## 2024-10-08 RX ORDER — ACYCLOVIR 800 MG/1
800 TABLET ORAL 2 TIMES DAILY
Qty: 10 TABLET | Refills: 0 | Status: SHIPPED | OUTPATIENT
Start: 2024-10-08 | End: 2024-10-13

## 2024-10-08 RX ORDER — CEPHALEXIN 500 MG/1
500 CAPSULE ORAL 4 TIMES DAILY
Qty: 28 CAPSULE | Refills: 0 | Status: SHIPPED | OUTPATIENT
Start: 2024-10-08 | End: 2024-10-15

## 2024-10-08 SDOH — ECONOMIC STABILITY: FOOD INSECURITY: WITHIN THE PAST 12 MONTHS, YOU WORRIED THAT YOUR FOOD WOULD RUN OUT BEFORE YOU GOT MONEY TO BUY MORE.: NEVER TRUE

## 2024-10-08 SDOH — ECONOMIC STABILITY: INCOME INSECURITY: HOW HARD IS IT FOR YOU TO PAY FOR THE VERY BASICS LIKE FOOD, HOUSING, MEDICAL CARE, AND HEATING?: NOT HARD AT ALL

## 2024-10-08 SDOH — ECONOMIC STABILITY: FOOD INSECURITY: WITHIN THE PAST 12 MONTHS, THE FOOD YOU BOUGHT JUST DIDN'T LAST AND YOU DIDN'T HAVE MONEY TO GET MORE.: NEVER TRUE

## 2024-10-08 ASSESSMENT — ENCOUNTER SYMPTOMS: RESPIRATORY NEGATIVE: 1

## 2024-10-08 NOTE — PROGRESS NOTES
Subjective   Patient ID: Sandie Marie is a 64 y.o. female.    HPI  Patient presents with sores on her scalp that are very painful that started Thursday. Is taking tylenol every 4-6 hours with some relief. No change in products at home. Also needs referral for sleep study, she does snore. Her prior one  and cardiology wants her to get one.     Review of Systems   Constitutional: Negative.    Respiratory: Negative.     Cardiovascular: Negative.    Skin:         Scalp sores          Objective   Physical Exam  Constitutional:       Appearance: Normal appearance.   Cardiovascular:      Rate and Rhythm: Normal rate and regular rhythm.   Skin:     Comments: Circular area posterior scalp with erythema and scabbing, very tender to touch   Neurological:      Mental Status: She is alert.            Assessment /Plan:     Diagnosis Orders   1. Snoring  Juan Cabrera MD, Pulmonary, [facility]      2. Sore on scalp  Unclear whether shingles or folliculitis. Will start zovirax though it is late in the course and keflex. Patient is to call if no improvement or signs and symptoms worsen.                      MARY Montalvo

## 2024-10-09 ENCOUNTER — TELEPHONE (OUTPATIENT)
Dept: FAMILY MEDICINE CLINIC | Age: 64
End: 2024-10-09

## 2024-10-09 NOTE — TELEPHONE ENCOUNTER
Patient called to ask Elisa if she thought it would be safe to use a topical lidocaine cream on her scalp where the sores are located.  She says the tylenol helps some but wears off too quickly and it is really sore and hurting her.  She understands Elisa is out of the office until Friday.

## 2024-10-11 NOTE — TELEPHONE ENCOUNTER
Patient was advised of physician's recommendation to use sparingly and only on areas which are not open breaks or skin eruptions.  She understood

## 2024-10-22 ENCOUNTER — OFFICE VISIT (OUTPATIENT)
Dept: PULMONOLOGY | Age: 64
End: 2024-10-22
Payer: COMMERCIAL

## 2024-10-22 VITALS
HEIGHT: 59 IN | TEMPERATURE: 97.3 F | RESPIRATION RATE: 16 BRPM | OXYGEN SATURATION: 96 % | BODY MASS INDEX: 31.25 KG/M2 | SYSTOLIC BLOOD PRESSURE: 138 MMHG | HEART RATE: 64 BPM | DIASTOLIC BLOOD PRESSURE: 74 MMHG | WEIGHT: 155 LBS

## 2024-10-22 DIAGNOSIS — G47.30 SLEEP APNEA, UNSPECIFIED TYPE: Primary | ICD-10-CM

## 2024-10-22 DIAGNOSIS — I10 ESSENTIAL HYPERTENSION: ICD-10-CM

## 2024-10-22 PROCEDURE — 3078F DIAST BP <80 MM HG: CPT | Performed by: INTERNAL MEDICINE

## 2024-10-22 PROCEDURE — G8427 DOCREV CUR MEDS BY ELIG CLIN: HCPCS | Performed by: INTERNAL MEDICINE

## 2024-10-22 PROCEDURE — G8484 FLU IMMUNIZE NO ADMIN: HCPCS | Performed by: INTERNAL MEDICINE

## 2024-10-22 PROCEDURE — 3017F COLORECTAL CA SCREEN DOC REV: CPT | Performed by: INTERNAL MEDICINE

## 2024-10-22 PROCEDURE — 3075F SYST BP GE 130 - 139MM HG: CPT | Performed by: INTERNAL MEDICINE

## 2024-10-22 PROCEDURE — 4004F PT TOBACCO SCREEN RCVD TLK: CPT | Performed by: INTERNAL MEDICINE

## 2024-10-22 PROCEDURE — G8417 CALC BMI ABV UP PARAM F/U: HCPCS | Performed by: INTERNAL MEDICINE

## 2024-10-22 PROCEDURE — 99203 OFFICE O/P NEW LOW 30 MIN: CPT | Performed by: INTERNAL MEDICINE

## 2024-10-22 ASSESSMENT — SLEEP AND FATIGUE QUESTIONNAIRES
HOW LIKELY ARE YOU TO NOD OFF OR FALL ASLEEP WHILE WATCHING TV: MODERATE CHANCE OF DOZING
HOW LIKELY ARE YOU TO NOD OFF OR FALL ASLEEP WHILE SITTING QUIETLY AFTER LUNCH WITHOUT ALCOHOL: HIGH CHANCE OF DOZING
HOW LIKELY ARE YOU TO NOD OFF OR FALL ASLEEP WHILE SITTING AND TALKING TO SOMEONE: WOULD NEVER DOZE
HOW LIKELY ARE YOU TO NOD OFF OR FALL ASLEEP WHILE WATCHING TV: MODERATE CHANCE OF DOZING
HOW LIKELY ARE YOU TO NOD OFF OR FALL ASLEEP IN A CAR, WHILE STOPPED FOR A FEW MINUTES IN TRAFFIC: SLIGHT CHANCE OF DOZING
HOW LIKELY ARE YOU TO NOD OFF OR FALL ASLEEP WHILE SITTING AND READING: HIGH CHANCE OF DOZING
HOW LIKELY ARE YOU TO NOD OFF OR FALL ASLEEP WHILE SITTING AND READING: HIGH CHANCE OF DOZING
HOW LIKELY ARE YOU TO NOD OFF OR FALL ASLEEP WHILE LYING DOWN TO REST IN THE AFTERNOON WHEN CIRCUMSTANCES PERMIT: HIGH CHANCE OF DOZING
HOW LIKELY ARE YOU TO NOD OFF OR FALL ASLEEP WHILE SITTING INACTIVE IN A PUBLIC PLACE: MODERATE CHANCE OF DOZING
HOW LIKELY ARE YOU TO NOD OFF OR FALL ASLEEP WHILE SITTING AND TALKING TO SOMEONE: WOULD NEVER DOZE
ESS TOTAL SCORE: 16
HOW LIKELY ARE YOU TO NOD OFF OR FALL ASLEEP WHEN YOU ARE A PASSENGER IN A CAR FOR AN HOUR WITHOUT A BREAK: MODERATE CHANCE OF DOZING
HOW LIKELY ARE YOU TO NOD OFF OR FALL ASLEEP WHILE LYING DOWN TO REST IN THE AFTERNOON WHEN CIRCUMSTANCES PERMIT: HIGH CHANCE OF DOZING
HOW LIKELY ARE YOU TO NOD OFF OR FALL ASLEEP WHILE SITTING INACTIVE IN A PUBLIC PLACE: MODERATE CHANCE OF DOZING
HOW LIKELY ARE YOU TO NOD OFF OR FALL ASLEEP WHILE SITTING QUIETLY AFTER LUNCH WITHOUT ALCOHOL: HIGH CHANCE OF DOZING
HOW LIKELY ARE YOU TO NOD OFF OR FALL ASLEEP WHEN YOU ARE A PASSENGER IN A CAR FOR AN HOUR WITHOUT A BREAK: MODERATE CHANCE OF DOZING
HOW LIKELY ARE YOU TO NOD OFF OR FALL ASLEEP IN A CAR, WHILE STOPPED FOR A FEW MINUTES IN TRAFFIC: SLIGHT CHANCE OF DOZING

## 2024-10-22 NOTE — PROGRESS NOTES
SLEEP MEDICINE CONSULT NOTE  CC: Snoring  Referring Provider: Patient is being seen at the request of Elisa Malcolm for a consultation to evaluate for Obstructive Sleep Apnea.    Presenting HPI 10/22/24: This is a 64-year-old female with hypertension and anxiety who was referred for snoring and suspected sleep apnea.  She has a long history of snoring, was told by a recovery nurse that she has sleep apnea.  She has excessive daytime sleepiness and difficult to treat hypertension, headaches upon awakening and dry mouth upon awakening.  ~ 7 hrs of sleep per night  Bedtime 2 am & rise time 8:30-9 am  Restroom 0x/night     1 naps during the day.  1 hour   No car wrecks/near wrecks because of the sleepiness or nodding off while driving.           10/22/2024    10:38 AM   Sleep Medicine   Sitting and reading 3   Watching TV 2   Sitting, inactive in a public place (e.g. a theatre or a meeting) 2   As a passenger in a car for an hour without a break 2   Lying down to rest in the afternoon when circumstances permit 3   Sitting and talking to someone 0   Sitting quietly after a lunch without alcohol 3   In a car, while stopped for a few minutes in traffic 1   [place] Sleepiness Score 16   Neck (Inches) 13.5        Past Medical History:   Diagnosis Date    Anxiety     Arthritis     Degenerative arthritis     back     Depression     Exocrine pancreatic insufficiency     Dr. Ochoa    Hyperlipidemia     Hypertension      Past Surgical History:   Procedure Laterality Date    APPENDECTOMY      CHOLECYSTECTOMY      COLONOSCOPY  12/30/2014    normal    ENDOMETRIAL ABLATION      EYE SURGERY      Iris plateau syndrome    UPPER GASTROINTESTINAL ENDOSCOPY  February 2023    Barrettes esophagus     Medication list was reviewed and updated as needed in Epic.   reports that she has been smoking cigarettes. She started smoking about 41 years ago. She has a 41.3 pack-year smoking history. She has never used smokeless tobacco.  family history

## 2024-10-22 NOTE — PROGRESS NOTES
MA Communication:  The following orders are received by verbal communication from Marquez Almanzar MD        Orders include:        Home sleep study   Follow up after with PA or DR Almanzar

## 2024-10-22 NOTE — PATIENT INSTRUCTIONS
What's next:  Schedule a study with the sleep lab (call them at [phone] if you do not receive their call)  Read through the sleep hygiene tips below to see what kind of changes you can start working on now  Meet with our sleep NP or PA after your sleep study to discuss results, options and recommendations      The Sleep Center at [facility] - [address], [address], [place] [address]  The Sleep center at [facility] - [facility] \[facility]", [address] [id number]  Phone for both is: [phone] Fax: [phone]                For in-lab testing: please bring with you:  Appropriate nightclothes (pajamas, sweats, etc.), slippers and robe  All medications you will need during you stay, including any breathing medications, nebulizers and metered dose inhalers  Your own toiletries and hairdryer if you wish to shower before you leave  Current photo I.D. and Insurance card  We recommend that you not bring valuables with you to the Sleep Center, as we cannot be responsible for any lost or damaged personal items.  Please note:  There is no smoking allowed anywhere on [facility] at any time.  Each patient room is a private room with a private bathroom.  We do not allow any pillows or bed linens from home due to health regulations  The in-lab test itself consist of electrodes and sensors attached to specific areas of your scalp, face, chest and legs. We will also monitor respiratory effort, nasal and oral airflow and oxygen levels. The test will not hurt- it is painless and not invasive in any way.    For at home testing: when you come to  the rental unit, please bring:  I.D. and Insurance card  ** Please note the Home Sleep Test Units are limited. It is a 1 night rental and must be dropped off the following day to ensure you are not billed a late or replacement fee. **    Please refrain from or reduce the use of caffeine and/or alcohol prior to your sleep study and avoid napping the day of

## 2024-10-28 DIAGNOSIS — E78.5 HYPERLIPIDEMIA, UNSPECIFIED HYPERLIPIDEMIA TYPE: ICD-10-CM

## 2024-10-29 RX ORDER — FENOFIBRATE 160 MG/1
160 TABLET ORAL DAILY
Qty: 90 TABLET | Refills: 1 | Status: SHIPPED | OUTPATIENT
Start: 2024-10-29

## 2024-10-29 NOTE — TELEPHONE ENCOUNTER
Sandie Marie [phone] (home)    is requesting refill(s) of medication Fenofibrate 160 mg Tablet to preferred pharmacy [facility]    Last OV 05/14/24 (pertaining to medication)   Last refill 05/14/24 (per medication requested)  Next office visit scheduled or attempted Yes  11/15/24     
no

## 2024-11-07 ENCOUNTER — HOSPITAL ENCOUNTER (OUTPATIENT)
Dept: SLEEP CENTER | Age: 64
Discharge: HOME | End: 2024-11-09
Payer: COMMERCIAL

## 2024-11-07 DIAGNOSIS — G47.30 SLEEP APNEA, UNSPECIFIED TYPE: ICD-10-CM

## 2024-11-07 PROCEDURE — 95806 SLEEP STUDY UNATT&RESP EFFT: CPT

## 2024-11-11 DIAGNOSIS — G47.10 HYPERSOMNIA: ICD-10-CM

## 2024-11-11 DIAGNOSIS — G47.30 SLEEP APNEA, UNSPECIFIED TYPE: Primary | ICD-10-CM

## 2024-11-11 NOTE — PROGRESS NOTES
HST was nondiagnostic, showing an AHI less than 5.  Concern for sleep apnea remains as does hypersomnia.  PSG is recommended.

## 2024-11-15 ENCOUNTER — OFFICE VISIT (OUTPATIENT)
Dept: FAMILY MEDICINE CLINIC | Age: 64
End: 2024-11-15
Payer: COMMERCIAL

## 2024-11-15 VITALS
DIASTOLIC BLOOD PRESSURE: 68 MMHG | RESPIRATION RATE: 16 BRPM | SYSTOLIC BLOOD PRESSURE: 122 MMHG | HEIGHT: 59 IN | BODY MASS INDEX: 31.21 KG/M2 | OXYGEN SATURATION: 95 % | HEART RATE: 72 BPM | WEIGHT: 154.8 LBS

## 2024-11-15 DIAGNOSIS — R73.09 ELEVATED GLUCOSE: ICD-10-CM

## 2024-11-15 DIAGNOSIS — I10 ESSENTIAL HYPERTENSION: ICD-10-CM

## 2024-11-15 DIAGNOSIS — E78.5 HYPERLIPIDEMIA, UNSPECIFIED HYPERLIPIDEMIA TYPE: Primary | ICD-10-CM

## 2024-11-15 DIAGNOSIS — R05.1 ACUTE COUGH: ICD-10-CM

## 2024-11-15 PROCEDURE — 3078F DIAST BP <80 MM HG: CPT | Performed by: PHYSICIAN ASSISTANT

## 2024-11-15 PROCEDURE — 99214 OFFICE O/P EST MOD 30 MIN: CPT | Performed by: PHYSICIAN ASSISTANT

## 2024-11-15 PROCEDURE — G8427 DOCREV CUR MEDS BY ELIG CLIN: HCPCS | Performed by: PHYSICIAN ASSISTANT

## 2024-11-15 PROCEDURE — 4004F PT TOBACCO SCREEN RCVD TLK: CPT | Performed by: PHYSICIAN ASSISTANT

## 2024-11-15 PROCEDURE — G8417 CALC BMI ABV UP PARAM F/U: HCPCS | Performed by: PHYSICIAN ASSISTANT

## 2024-11-15 PROCEDURE — 3017F COLORECTAL CA SCREEN DOC REV: CPT | Performed by: PHYSICIAN ASSISTANT

## 2024-11-15 PROCEDURE — 3074F SYST BP LT 130 MM HG: CPT | Performed by: PHYSICIAN ASSISTANT

## 2024-11-15 PROCEDURE — G8484 FLU IMMUNIZE NO ADMIN: HCPCS | Performed by: PHYSICIAN ASSISTANT

## 2024-11-15 PROCEDURE — 36415 COLL VENOUS BLD VENIPUNCTURE: CPT | Performed by: PHYSICIAN ASSISTANT

## 2024-11-15 ASSESSMENT — PATIENT HEALTH QUESTIONNAIRE - PHQ9
5. POOR APPETITE OR OVEREATING: NOT AT ALL
SUM OF ALL RESPONSES TO PHQ QUESTIONS 1-9: 5
SUM OF ALL RESPONSES TO PHQ QUESTIONS 1-9: 5
3. TROUBLE FALLING OR STAYING ASLEEP: NEARLY EVERY DAY
7. TROUBLE CONCENTRATING ON THINGS, SUCH AS READING THE NEWSPAPER OR WATCHING TELEVISION: NOT AT ALL
10. IF YOU CHECKED OFF ANY PROBLEMS, HOW DIFFICULT HAVE THESE PROBLEMS MADE IT FOR YOU TO DO YOUR WORK, TAKE CARE OF THINGS AT HOME, OR GET ALONG WITH OTHER PEOPLE: NOT DIFFICULT AT ALL
2. FEELING DOWN, DEPRESSED OR HOPELESS: NOT AT ALL
6. FEELING BAD ABOUT YOURSELF - OR THAT YOU ARE A FAILURE OR HAVE LET YOURSELF OR YOUR FAMILY DOWN: NOT AT ALL
SUM OF ALL RESPONSES TO PHQ QUESTIONS 1-9: 5
1. LITTLE INTEREST OR PLEASURE IN DOING THINGS: NOT AT ALL
SUM OF ALL RESPONSES TO PHQ9 QUESTIONS 1 & 2: 0
9. THOUGHTS THAT YOU WOULD BE BETTER OFF DEAD, OR OF HURTING YOURSELF: NOT AT ALL
4. FEELING TIRED OR HAVING LITTLE ENERGY: MORE THAN HALF THE DAYS
SUM OF ALL RESPONSES TO PHQ QUESTIONS 1-9: 5

## 2024-11-15 NOTE — PROGRESS NOTES
SUBJECTIVE:  Sandie Marie is a 64 y.o. female who presents for evaluation of hypertension and hyperlipidemia. She indicates that she is feeling well and denies any symptoms referable to her elevated blood pressure.   Specifically denies chest pain, palpitations, dyspnea, orthopnea, PND or peripheral edema.  No anorexia, arthralgia, or leg cramps noted. Current medication regimen is as listed below. She denies any side effects of medication, and has been taking it regularly.    Cough started on Wednesday. Is having some congestion. No SOB or wheezing, no fevers.     Current Outpatient Medications   Medication Sig Dispense Refill    fenofibrate 160 MG tablet TAKE 1 TABLET DAILY 90 tablet 1    cloNIDine (CATAPRES) 0.1 MG tablet TAKE 1 TABLET BY MOUTH EVERY DAY 90 tablet 0    carvedilol (COREG) 12.5 MG tablet Take 1 tablet by mouth 2 times daily 180 tablet 3    amLODIPine (NORVASC) 5 MG tablet Take 1 tablet by mouth daily 90 tablet 1    atorvastatin (LIPITOR) 40 MG tablet Take 1 tablet by mouth daily 90 tablet 1    venlafaxine (EFFEXOR XR) 150 MG extended release capsule Take 1 capsule by mouth daily 90 capsule 1    venlafaxine (EFFEXOR XR) 75 MG extended release capsule TAKE 1 CAPSULE DAILY 90 capsule 1    busPIRone (BUSPAR) 10 MG tablet Take 1 tablet by mouth in the morning, at noon, and at bedtime 270 tablet 1    pantoprazole (PROTONIX) 40 MG tablet Take 1 tablet by mouth daily 90 tablet 1    ZENPEP 25008-332512 units CPEP Take 1 capsule by mouth 3 times daily (with meals)      hydroCHLOROthiazide 12.5 MG capsule TAKE 1 CAPSULE BY MOUTH EVERY DAY IN THE MORNING 90 capsule 1    losartan (COZAAR) 100 MG tablet TAKE 1 TABLET BY MOUTH EVERY DAY 90 tablet 1    famotidine (PEPCID) 40 MG tablet 1 (ONE) TABLET IN THE EVENING       No current facility-administered medications for this visit.       Allergies   Allergen Reactions    Bleph-10 [Sulfacetamide] Other (See Comments)     Eye irritation    Erythromycin Nausea Only

## 2024-11-16 LAB
ALBUMIN SERPL-MCNC: 4.4 G/DL (ref 3.4–5)
ALBUMIN/GLOB SERPL: 1.6 {RATIO} (ref 1.1–2.2)
ALP SERPL-CCNC: 49 U/L (ref 40–129)
ALT SERPL-CCNC: 21 U/L (ref 10–40)
ANION GAP SERPL CALCULATED.3IONS-SCNC: 14 MMOL/L (ref 3–16)
AST SERPL-CCNC: 27 U/L (ref 15–37)
BILIRUB SERPL-MCNC: 0.6 MG/DL (ref 0–1)
BUN SERPL-MCNC: 10 MG/DL (ref 7–20)
CALCIUM SERPL-MCNC: 9.7 MG/DL (ref 8.3–10.6)
CHLORIDE SERPL-SCNC: 105 MMOL/L (ref 99–110)
CHOLEST SERPL-MCNC: 158 MG/DL (ref 0–199)
CO2 SERPL-SCNC: 24 MMOL/L (ref 21–32)
CREAT SERPL-MCNC: 0.9 MG/DL (ref 0.6–1.2)
EST. AVERAGE GLUCOSE BLD GHB EST-MCNC: 125.5 MG/DL
GFR SERPLBLD CREATININE-BSD FMLA CKD-EPI: 72 ML/MIN/{1.73_M2}
GLUCOSE SERPL-MCNC: 99 MG/DL (ref 70–99)
HBA1C MFR BLD: 6 %
HDLC SERPL-MCNC: 30 MG/DL (ref 40–60)
LDLC SERPL CALC-MCNC: 92 MG/DL
POTASSIUM SERPL-SCNC: 3.9 MMOL/L (ref 3.5–5.1)
PROT SERPL-MCNC: 7.1 G/DL (ref 6.4–8.2)
SODIUM SERPL-SCNC: 143 MMOL/L (ref 136–145)
TRIGL SERPL-MCNC: 180 MG/DL (ref 0–150)
VLDLC SERPL CALC-MCNC: 36 MG/DL

## 2024-11-25 ENCOUNTER — TELEPHONE (OUTPATIENT)
Dept: FAMILY MEDICINE CLINIC | Age: 64
End: 2024-11-25

## 2024-11-25 DIAGNOSIS — R05.1 ACUTE COUGH: Primary | ICD-10-CM

## 2024-11-25 RX ORDER — CODEINE PHOSPHATE AND GUAIFENESIN 10; 100 MG/5ML; MG/5ML
5 SOLUTION ORAL 3 TIMES DAILY PRN
Qty: 100 ML | Refills: 0 | Status: SHIPPED | OUTPATIENT
Start: 2024-11-25 | End: 2024-11-30

## 2024-11-25 NOTE — TELEPHONE ENCOUNTER
Pt states that she still has a really bad cough. She states that she cannot talk without coughing a lot. She is requesting a cough syrup be called into her pharmacy for this. She does not want the Tessalon Pearles because they do not help her. She wants this sent to the [facility] on file.     Render Post-Care Instructions In Note?: yes Detail Level: Detailed Duration Of Freeze Thaw-Cycle (Seconds): 0 Render Note In Bullet Format When Appropriate: No Post-Care Instructions: I reviewed with the patient in detail post-care instructions. Patient is to wear sunprotection, and avoid picking at any of the treated lesions. Pt may apply Vaseline to crusted or scabbing areas. Consent: The patient's consent was obtained including but not limited to risks of crusting, scabbing, blistering, scarring, darker or lighter pigmentary change, recurrence, incomplete removal and infection.

## 2024-11-29 DIAGNOSIS — F41.9 ANXIETY: ICD-10-CM

## 2024-11-29 DIAGNOSIS — I10 ESSENTIAL HYPERTENSION: ICD-10-CM

## 2024-11-29 DIAGNOSIS — E78.5 HYPERLIPIDEMIA, UNSPECIFIED HYPERLIPIDEMIA TYPE: ICD-10-CM

## 2024-11-29 NOTE — TELEPHONE ENCOUNTER
Sandie Marie is requesting refill(s) venlafaxine  Last OV 11/15/24 (pertaining to medication)  LR 5/14/24 (per medication requested)  Next office visit scheduled or attempted No   If no, reason:  pt is due in May      Sandei Marie is requesting refill(s) buspirone  Last OV 11/15/24 (pertaining to medication)  LR 5/14/24 (per medication requested)  Next office visit scheduled or attempted No   If no, reason:        Sandie Marie is requesting refill(s) lipitor  Last OV 11/15/24 (pertaining to medication)  LR 5/14/24 (per medication requested)  Next office visit scheduled or attempted No   If no, reason:        Sandie Marie is requesting refill(s) amlodipine  Last OV 11/15/24 (pertaining to medication)  LR 5/14/24 (per medication requested)  Next office visit scheduled or attempted No   If no, reason:

## 2024-12-02 RX ORDER — VENLAFAXINE HYDROCHLORIDE 75 MG/1
CAPSULE, EXTENDED RELEASE ORAL
Qty: 90 CAPSULE | Refills: 1 | Status: SHIPPED | OUTPATIENT
Start: 2024-12-02

## 2024-12-02 RX ORDER — AMLODIPINE BESYLATE 5 MG/1
5 TABLET ORAL DAILY
Qty: 90 TABLET | Refills: 1 | Status: SHIPPED | OUTPATIENT
Start: 2024-12-02

## 2024-12-02 RX ORDER — BUSPIRONE HYDROCHLORIDE 10 MG/1
TABLET ORAL
Qty: 270 TABLET | Refills: 1 | Status: SHIPPED | OUTPATIENT
Start: 2024-12-02

## 2024-12-02 RX ORDER — ATORVASTATIN CALCIUM 40 MG/1
40 TABLET, FILM COATED ORAL DAILY
Qty: 90 TABLET | Refills: 1 | Status: SHIPPED | OUTPATIENT
Start: 2024-12-02

## 2024-12-12 ENCOUNTER — HOSPITAL ENCOUNTER (OUTPATIENT)
Dept: SLEEP CENTER | Age: 64
Discharge: HOME | End: 2024-12-14
Payer: COMMERCIAL

## 2024-12-12 DIAGNOSIS — G47.10 HYPERSOMNIA: ICD-10-CM

## 2024-12-12 DIAGNOSIS — G47.30 SLEEP APNEA, UNSPECIFIED TYPE: ICD-10-CM

## 2024-12-12 PROCEDURE — 95810 POLYSOM 6/> YRS 4/> PARAM: CPT

## 2024-12-26 NOTE — TELEPHONE ENCOUNTER
Sandie Marie is requesting refill(s) losartan  Last OV 11/15/24 (pertaining to medication)  LR 4/29/24 (per medication requested)  Next office visit scheduled or attempted No   If no, reason:  pt is due in May      Sandie Marie is requesting refill(s) hctz  Last OV 11/15/24 (pertaining to medication)  LR 4/29/24 (per medication requested)  Next office visit scheduled or attempted No   If no, reason:

## 2024-12-26 NOTE — TELEPHONE ENCOUNTER
Sandie Marie is requesting refill(s) clonidine  Last OV 11/15/24 (pertaining to medication)  LR 7/15/24 (per medication requested)  Next office visit scheduled or attempted No   If no, reason:  pt is due in May

## 2024-12-27 RX ORDER — HYDROCHLOROTHIAZIDE 12.5 MG/1
CAPSULE ORAL
Qty: 90 CAPSULE | Refills: 1 | Status: SHIPPED | OUTPATIENT
Start: 2024-12-27

## 2024-12-27 RX ORDER — LOSARTAN POTASSIUM 100 MG/1
TABLET ORAL
Qty: 90 TABLET | Refills: 1 | Status: SHIPPED | OUTPATIENT
Start: 2024-12-27

## 2024-12-27 RX ORDER — CLONIDINE HYDROCHLORIDE 0.1 MG/1
0.1 TABLET ORAL DAILY
Qty: 90 TABLET | Refills: 1 | Status: SHIPPED | OUTPATIENT
Start: 2024-12-27

## 2024-12-30 ENCOUNTER — OFFICE VISIT (OUTPATIENT)
Dept: PULMONOLOGY | Age: 64
End: 2024-12-30
Payer: COMMERCIAL

## 2024-12-30 VITALS
WEIGHT: 156 LBS | DIASTOLIC BLOOD PRESSURE: 73 MMHG | SYSTOLIC BLOOD PRESSURE: 127 MMHG | TEMPERATURE: 97.6 F | RESPIRATION RATE: 16 BRPM | BODY MASS INDEX: 30.63 KG/M2 | HEIGHT: 60 IN | HEART RATE: 67 BPM | OXYGEN SATURATION: 93 %

## 2024-12-30 DIAGNOSIS — G47.61 PLMD (PERIODIC LIMB MOVEMENT DISORDER): ICD-10-CM

## 2024-12-30 DIAGNOSIS — D50.8 OTHER IRON DEFICIENCY ANEMIA: Primary | ICD-10-CM

## 2024-12-30 PROCEDURE — G8427 DOCREV CUR MEDS BY ELIG CLIN: HCPCS | Performed by: INTERNAL MEDICINE

## 2024-12-30 PROCEDURE — 3017F COLORECTAL CA SCREEN DOC REV: CPT | Performed by: INTERNAL MEDICINE

## 2024-12-30 PROCEDURE — G8484 FLU IMMUNIZE NO ADMIN: HCPCS | Performed by: INTERNAL MEDICINE

## 2024-12-30 PROCEDURE — 4004F PT TOBACCO SCREEN RCVD TLK: CPT | Performed by: INTERNAL MEDICINE

## 2024-12-30 PROCEDURE — G8417 CALC BMI ABV UP PARAM F/U: HCPCS | Performed by: INTERNAL MEDICINE

## 2024-12-30 PROCEDURE — 99214 OFFICE O/P EST MOD 30 MIN: CPT | Performed by: INTERNAL MEDICINE

## 2024-12-30 PROCEDURE — 3078F DIAST BP <80 MM HG: CPT | Performed by: INTERNAL MEDICINE

## 2024-12-30 PROCEDURE — 3074F SYST BP LT 130 MM HG: CPT | Performed by: INTERNAL MEDICINE

## 2024-12-30 RX ORDER — BUPROPION HYDROCHLORIDE 150 MG/1
TABLET, EXTENDED RELEASE ORAL
Qty: 60 TABLET | Refills: 1 | Status: SHIPPED | OUTPATIENT
Start: 2024-12-30

## 2024-12-30 ASSESSMENT — SLEEP AND FATIGUE QUESTIONNAIRES
HOW LIKELY ARE YOU TO NOD OFF OR FALL ASLEEP WHEN YOU ARE A PASSENGER IN A CAR FOR AN HOUR WITHOUT A BREAK: MODERATE CHANCE OF DOZING
HOW LIKELY ARE YOU TO NOD OFF OR FALL ASLEEP WHILE SITTING AND TALKING TO SOMEONE: WOULD NEVER DOZE
HOW LIKELY ARE YOU TO NOD OFF OR FALL ASLEEP WHILE SITTING AND READING: HIGH CHANCE OF DOZING
HOW LIKELY ARE YOU TO NOD OFF OR FALL ASLEEP WHEN YOU ARE A PASSENGER IN A CAR FOR AN HOUR WITHOUT A BREAK: MODERATE CHANCE OF DOZING
HOW LIKELY ARE YOU TO NOD OFF OR FALL ASLEEP WHILE WATCHING TV: MODERATE CHANCE OF DOZING
HOW LIKELY ARE YOU TO NOD OFF OR FALL ASLEEP WHILE SITTING AND TALKING TO SOMEONE: WOULD NEVER DOZE
HOW LIKELY ARE YOU TO NOD OFF OR FALL ASLEEP WHILE SITTING AND READING: HIGH CHANCE OF DOZING
HOW LIKELY ARE YOU TO NOD OFF OR FALL ASLEEP IN A CAR, WHILE STOPPED FOR A FEW MINUTES IN TRAFFIC: WOULD NEVER DOZE
HOW LIKELY ARE YOU TO NOD OFF OR FALL ASLEEP WHILE SITTING QUIETLY AFTER LUNCH WITHOUT ALCOHOL: SLIGHT CHANCE OF DOZING
HOW LIKELY ARE YOU TO NOD OFF OR FALL ASLEEP WHILE LYING DOWN TO REST IN THE AFTERNOON WHEN CIRCUMSTANCES PERMIT: HIGH CHANCE OF DOZING
HOW LIKELY ARE YOU TO NOD OFF OR FALL ASLEEP WHILE SITTING INACTIVE IN A PUBLIC PLACE: MODERATE CHANCE OF DOZING
HOW LIKELY ARE YOU TO NOD OFF OR FALL ASLEEP WHILE WATCHING TV: MODERATE CHANCE OF DOZING
ESS TOTAL SCORE: 13
HOW LIKELY ARE YOU TO NOD OFF OR FALL ASLEEP WHILE SITTING QUIETLY AFTER LUNCH WITHOUT ALCOHOL: SLIGHT CHANCE OF DOZING
HOW LIKELY ARE YOU TO NOD OFF OR FALL ASLEEP WHILE SITTING INACTIVE IN A PUBLIC PLACE: MODERATE CHANCE OF DOZING
HOW LIKELY ARE YOU TO NOD OFF OR FALL ASLEEP IN A CAR, WHILE STOPPED FOR A FEW MINUTES IN TRAFFIC: WOULD NEVER DOZE
HOW LIKELY ARE YOU TO NOD OFF OR FALL ASLEEP WHILE LYING DOWN TO REST IN THE AFTERNOON WHEN CIRCUMSTANCES PERMIT: HIGH CHANCE OF DOZING

## 2024-12-30 NOTE — PROGRESS NOTES
MA Communication:  The following orders are received by verbal communication from Marquez Almanzar MD        Orders include:      Blood work and follow up 1 month

## 2024-12-30 NOTE — PROGRESS NOTES
SLEEP MEDICINE CONSULT NOTE  CC: Snoring  Referring Provider: Patient is being seen at the request of Elisa Malcolm for a consultation to evaluate for Obstructive Sleep Apnea.    Interval History 12/30/24  -had HST that did not show ZIGGY.  Subsequent PSG showed significant PLMS, PLM arousal index of 8.  She tells me that she used to have relatively severe RLS but she does not experience symptoms anymore.  She has been on venlafaxine for a decade.  She does get benefit from it but has recently been interested in trying something different.  No SI/HI       Presenting HPI 10/22/24: This is a 64-year-old female with hypertension and anxiety who was referred for snoring and suspected sleep apnea.  She has a long history of snoring, was told by a recovery nurse that she has sleep apnea.  She has excessive daytime sleepiness and difficult to treat hypertension, headaches upon awakening and dry mouth upon awakening.  ~ 7 hrs of sleep per night  Bedtime 2 am & rise time 8:30-9 am  Restroom 0x/night     1 naps during the day.  1 hour   No car wrecks/near wrecks because of the sleepiness or nodding off while driving.           12/30/2024    12:24 PM 10/22/2024    10:38 AM   Sleep Medicine   Sitting and reading 3 3   Watching TV 2 2   Sitting, inactive in a public place (e.g. a theatre or a meeting) 2 2   As a passenger in a car for an hour without a break 2 2   Lying down to rest in the afternoon when circumstances permit 3 3   Sitting and talking to someone 0 0   Sitting quietly after a lunch without alcohol 1 3   In a car, while stopped for a few minutes in traffic 0 1   [place] Sleepiness Score 13 16   Neck (Inches)  13.5      PHYSICAL EXAM:  There were no vitals taken for this visit.'   155# 10/22/2024  Constitutional:  No acute distress.    HENT:  Oropharynx is clear and moist. Mallampati class 3.  Eyes: Pupils equal, round. No scleral icterus.   Neck: No tracheal deviation present.  Circumference 13.5 inches

## 2025-01-08 ENCOUNTER — TELEPHONE (OUTPATIENT)
Dept: PULMONOLOGY | Age: 65
End: 2025-01-08

## 2025-01-08 NOTE — TELEPHONE ENCOUNTER
Patient is calling stating Dr. Almanzar wanted her to wean off of the venlafaxine (EFFEXOR XR) 150 MG extended release capsule [[id number]] and take   buPROPion (WELLBUTRIN SR) 150 MG extended release tablet [[id number]] Patient does not like the Wellbutrin and wants to know if she can stop it and go back to the Effexor. Please call patient and advise.

## 2025-01-21 RX ORDER — BUPROPION HYDROCHLORIDE 150 MG/1
TABLET, EXTENDED RELEASE ORAL
Qty: 180 TABLET | Refills: 1 | OUTPATIENT
Start: 2025-01-21

## 2025-01-21 NOTE — TELEPHONE ENCOUNTER
Last OV:  12/30/24  Next OV:  1/29/25  Last Refill:  12/30/24    
Principal Discharge DX:	Kidney stones  Secondary Diagnosis:	Adrenal gland anomaly

## 2025-01-23 ENCOUNTER — HOSPITAL ENCOUNTER (OUTPATIENT)
Age: 65
Discharge: HOME | End: 2025-01-23
Payer: COMMERCIAL

## 2025-01-23 DIAGNOSIS — D50.8 OTHER IRON DEFICIENCY ANEMIA: ICD-10-CM

## 2025-01-23 LAB
FERRITIN SERPL IA-MCNC: 10.5 NG/ML (ref 15–150)
IRON SATN MFR SERPL: 9 % (ref 15–50)
IRON SERPL-MCNC: 45 UG/DL (ref 37–145)
TIBC SERPL-MCNC: 512 UG/DL (ref 260–445)

## 2025-01-23 PROCEDURE — 82728 ASSAY OF FERRITIN: CPT

## 2025-01-23 PROCEDURE — 83550 IRON BINDING TEST: CPT

## 2025-01-23 PROCEDURE — 36415 COLL VENOUS BLD VENIPUNCTURE: CPT

## 2025-01-23 PROCEDURE — 83540 ASSAY OF IRON: CPT

## 2025-01-28 RX ORDER — FERROUS SULFATE 325(65) MG
325 TABLET ORAL
Qty: 30 TABLET | Refills: 1 | Status: SHIPPED | OUTPATIENT
Start: 2025-01-29

## 2025-01-29 ENCOUNTER — OFFICE VISIT (OUTPATIENT)
Dept: PULMONOLOGY | Age: 65
End: 2025-01-29
Payer: COMMERCIAL

## 2025-01-29 VITALS
SYSTOLIC BLOOD PRESSURE: 117 MMHG | HEART RATE: 73 BPM | BODY MASS INDEX: 30.23 KG/M2 | RESPIRATION RATE: 16 BRPM | WEIGHT: 154 LBS | TEMPERATURE: 97 F | HEIGHT: 60 IN | DIASTOLIC BLOOD PRESSURE: 68 MMHG | OXYGEN SATURATION: 93 %

## 2025-01-29 DIAGNOSIS — G47.61 PLMD (PERIODIC LIMB MOVEMENT DISORDER): Primary | ICD-10-CM

## 2025-01-29 DIAGNOSIS — D50.8 OTHER IRON DEFICIENCY ANEMIA: ICD-10-CM

## 2025-01-29 PROCEDURE — 99214 OFFICE O/P EST MOD 30 MIN: CPT | Performed by: INTERNAL MEDICINE

## 2025-01-29 PROCEDURE — 4004F PT TOBACCO SCREEN RCVD TLK: CPT | Performed by: INTERNAL MEDICINE

## 2025-01-29 PROCEDURE — 3074F SYST BP LT 130 MM HG: CPT | Performed by: INTERNAL MEDICINE

## 2025-01-29 PROCEDURE — 3017F COLORECTAL CA SCREEN DOC REV: CPT | Performed by: INTERNAL MEDICINE

## 2025-01-29 PROCEDURE — G8417 CALC BMI ABV UP PARAM F/U: HCPCS | Performed by: INTERNAL MEDICINE

## 2025-01-29 PROCEDURE — 3078F DIAST BP <80 MM HG: CPT | Performed by: INTERNAL MEDICINE

## 2025-01-29 PROCEDURE — G8427 DOCREV CUR MEDS BY ELIG CLIN: HCPCS | Performed by: INTERNAL MEDICINE

## 2025-01-29 RX ORDER — GABAPENTIN 100 MG/1
CAPSULE ORAL
Qty: 90 CAPSULE | Refills: 1 | Status: SHIPPED | OUTPATIENT
Start: 2025-01-29 | End: 2025-04-01

## 2025-01-29 NOTE — PATIENT INSTRUCTIONS
Please remember to bring a list of medications and any CPAP or BiPAP machines to your next appointment with the office.     Out of respect for other patients and providers, we ask that you arrive no later than your scheduled appointment time.  If you arrive later than your appointment time, you may be asked to reschedule your appointment.     Late cancellations result in other patients being unable to utilize the appointment slot to see their physician.  Please avoid cancelling your appointment less than 1 week prior to the appointment date.  Patients with multiple missed appointments within 2 years may be dismissed from the practice.     In the next few weeks, you will be receiving a survey from [facility] regarding your visit today.  Your input is valuable to us & surveys are regularly reviewed by [facility] leadership.  It is very important to us that our patients receive excellent care.  If your experience was excellent, please let us know!  If your experience was not a good one, please tell us so we can make needed corrections. We hope you are comfortable recommending us to others for their healthcare needs.     We thank you for choosing [facility]!

## 2025-01-29 NOTE — PROGRESS NOTES
MA Communication:  The following orders are received by verbal communication from Marquez Almanzar MD        Orders include:          Follow up 1-2 months  
more refreshing/restrorative sleep, fewer awakenings  Tentatively plan to recheck iron levels in spring 2025, may need IV iron  See me in 1-2 month for gabapentin
